# Patient Record
Sex: FEMALE | Race: BLACK OR AFRICAN AMERICAN | Employment: FULL TIME | URBAN - METROPOLITAN AREA
[De-identification: names, ages, dates, MRNs, and addresses within clinical notes are randomized per-mention and may not be internally consistent; named-entity substitution may affect disease eponyms.]

---

## 2017-12-27 ENCOUNTER — HOSPITAL ENCOUNTER (OUTPATIENT)
Dept: MAMMOGRAPHY | Age: 47
Discharge: HOME | End: 2017-12-27
Attending: PHYSICIAN ASSISTANT
Payer: COMMERCIAL

## 2017-12-27 DIAGNOSIS — Z12.31 VISIT FOR SCREENING MAMMOGRAM: ICD-10-CM

## 2017-12-27 PROCEDURE — 77067 SCR MAMMO BI INCL CAD: CPT

## 2018-03-22 ENCOUNTER — OFFICE VISIT (OUTPATIENT)
Dept: OBGYN CLINIC | Age: 48
End: 2018-03-22

## 2018-03-22 VITALS
SYSTOLIC BLOOD PRESSURE: 112 MMHG | HEIGHT: 66 IN | WEIGHT: 175 LBS | DIASTOLIC BLOOD PRESSURE: 70 MMHG | BODY MASS INDEX: 28.12 KG/M2

## 2018-03-22 DIAGNOSIS — N89.8 VAGINAL DISCHARGE: Primary | ICD-10-CM

## 2018-03-22 DIAGNOSIS — N92.6 IRREGULAR MENSES: ICD-10-CM

## 2018-03-22 DIAGNOSIS — N93.9 ABNORMAL UTERINE BLEEDING (AUB): ICD-10-CM

## 2018-03-22 LAB
HCG URINE, QL. (POC): NEGATIVE
VALID INTERNAL CONTROL?: YES
WET MOUNT POCT, WMPOCT: NORMAL

## 2018-03-22 NOTE — PROGRESS NOTES
[address] [facility]  [email or web address]  [phone]    Fiordaliza Leos MD, FACOG       OB/GYN Problem visit    Chief Complaint:   Chief Complaint   Patient presents with    Menstrual Problem       History of Present Illness: This is a new problem being evaluated by this provider. The patient is a 41 y.o. [de-identified]  female who reports having irregular bleeding for 1 months. Pt states that she had a normal period 2/16/18 followed by another period 3/6/18. Patient states that the second period was a little lighter, but still lasted about 7 days. Pt states this happened a few years ago, and they put her on birth control pills for a few months, and her periods became regular again. She reports the symptoms are has improved. Aggravating factors include none. Alleviating factors include none. Was on injectables with IUI? With [facility]    She does not have other concerns. H/o yeast infections. LMP: Patient's last menstrual period was 03/06/2018. [address]:  Past Medical History:   Diagnosis Date    Abnormal Pap smear of cervix 2007    per pt, had cryo     H/O mammogram 12/28/18    normal per pt.  Pap smear for cervical cancer screening 01/29/2018    Normal per pt. Past Surgical History:   Procedure Laterality Date    HX GYN  2007    Cryo for abnl pap per pt- location unknonwn for records b/c office is closed    HX OTHER SURGICAL  2016    toe surgery     Family History   Problem Relation Age of Onset    Breast Cancer Sister 40    Cancer Mother      Social History   Substance Use Topics    Smoking status: Never Smoker    Smokeless tobacco: Never Used    Alcohol use No     No Known Allergies  No current outpatient prescriptions on file. No current facility-administered medications for this visit.         Review of Systems:  History obtained from the patient  Constitutional: negative for fevers, chills and weight loss  ENT ROS: negative for - hearing change, oral lesions or visual changes  Respiratory: negative for cough, wheezing or dyspnea on exertion  Cardiovascular: negative for chest pain, irregular heart beats, exertional chest pressure/discomfort  Gastrointestinal: negative for dysphagia, nausea and vomiting  Genito-Urinary ROS:  see HPI  Inteument/breast: negative for rash, breast lump and nipple discharge  Musculoskeletal:negative for stiff joints, neck pain and muscle weakness  Endocrine ROS: negative for - breast changes, galactorrhea or temperature intolerance  Hematological and Lymphatic ROS: negative for - blood clots, bruising or swollen lymph nodes    Physical Exam:  Visit Vitals    /70    Ht 5' 6\" (1.676 m)    Wt 175 lb (79.4 kg)    BMI 28.25 kg/m2       GENERAL: alert, well appearing, and in no distress  HEAD: normocephalic, atraumatic. PULM: clear to auscultation, no wheezes, rales or rhonchi, symmetric air entry   COR: normal rate and regular rhythm, S1 and S2 normal   ABDOMEN: soft, nontender, nondistended, no masses or organomegaly   EGBUS: no lesions, no inflammation, no masses  VULVA: normal appearing vulva with no masses, tenderness or lesions  VAGINA: normal appearing vagina with normal color, no lesions, thin white discharge  CERVIX: normal appearing cervix without discharge or lesions, non tender  UTERUS: uterus is normal size, shape, consistency and nontender   ADNEXA: normal adnexa in size, nontender and no masses  NEURO: alert, oriented, normal speech    Assessment:  Encounter Diagnoses   Name Primary?  Vaginal discharge Yes    Abnormal uterine bleeding (AUB)     Irregular menses        Plan:  The patient is advised that she should contact the office if she does not note improvement or if symptoms recur  Recommend follow up with PCP for non-gynecologic complaints and chronic medical problems. She should contact our office with any questions or concerns  She could keep her routine annual exam appointment.    We discussed potential causes of symptomatic bleeding: including but not limited to hormonal, medical, infection/inflammation and structural etiologies. We discussed options for managing symptoms including but not limited to observation, NSAIDS, hormonal management, IUDs, ablation, and hysterectomy. Labs: defer to check what PCP did  Get records/labs/us from [facility]  We discussed timed intercourse, menstrual charting, and s/sx of ovulation. I recommended a daily prenatal vitamin. We discussed that if conception does not occur within one year then additional evaluation may be indicated. Fu and US/SIS  SIS h/o given  We discussed typical perimenopausal bleeding patterns and symptoms. I recommend that she notify MD for chaotic or symptomatic bleeding, or bleeding in between menses or intermenstrual bleeding for additional evaluation. She can also schedule a consult to discuss management of symptoms of perimenopause that are concerning her or interfering with her life or day to day function or activity.        Orders Placed This Encounter    AMB POC WET PREP (AKA STAIN, INTERPRET, WET MOUNT)    AMB POC URINE PREGNANCY TEST, VISUAL COLOR COMPARISON       Results for orders placed or performed in visit on 03/22/18   AMB POC SMEAR, STAIN & INTERPRET, WET MOUNT   Result Value Ref Range    Wet mount (POC)      Narrative    MASOOD    Hypae: negative  Buds: negative    Wet Prep:  Trich: negative  Clue cells: negative  Hyphae: negative  Buds: negative  WBC's: normal     AMB POC URINE PREGNANCY TEST, VISUAL COLOR COMPARISON   Result Value Ref Range    VALID INTERNAL CONTROL POC Yes     HCG urine, Ql. (POC) Negative Negative

## 2018-03-22 NOTE — PATIENT INSTRUCTIONS
Abnormal Uterine Bleeding: Care Instructions  Your Care Instructions    Abnormal uterine bleeding (AUB) is irregular bleeding from the uterus that is longer or heavier than usual or does not occur at your regular time. Sometimes it is caused by changes in hormone levels. It can also be caused by growths in the uterus, such as fibroids or polyps. Sometimes a cause cannot be found. You may have heavy bleeding when you are not expecting your period. Your doctor may suggest a pregnancy test, if you think you are pregnant. Follow-up care is a key part of your treatment and safety. Be sure to make and go to all appointments, and call your doctor if you are having problems. It's also a good idea to know your test results and keep a list of the medicines you take. How can you care for yourself at home? · Be safe with medicines. Take pain medicines exactly as directed. ¨ If the doctor gave you a prescription medicine for pain, take it as prescribed. ¨ If you are not taking a prescription pain medicine, ask your doctor if you can take an over-the-counter medicine. · You may be low in iron because of blood loss. Eat a balanced diet that is high in iron and vitamin C. Foods rich in iron include red meat, shellfish, eggs, beans, and leafy green vegetables. Talk to your doctor about whether you need to take iron pills or a multivitamin. When should you call for help? Call 911 anytime you think you may need emergency care. For example, call if:  ? · You passed out (lost consciousness). ?Call your doctor now or seek immediate medical care if:  ? · You have new or worse belly or pelvic pain. ? · You have severe vaginal bleeding. ? · You feel dizzy or lightheaded, or you feel like you may faint. ? Watch closely for changes in your health, and be sure to contact your doctor if:  ? · You think you may be pregnant. ? · Your bleeding gets worse. ? · You do not get better as expected.    Where can you learn more?  Go to http://negin-shefali.info/. Enter R990 in the search box to learn more about \"Abnormal Uterine Bleeding: Care Instructions. \"  Current as of: October 13, 2016  Content Version: 11.4  © 1275-6663 [employer]. Care instructions adapted under license by [email or web address] (which disclaims liability or warranty for this information). If you have questions about a medical condition or this instruction, always ask your healthcare professional. [place] [address] any warranty or liability for your use of this information.

## 2018-05-15 ENCOUNTER — OFFICE VISIT (OUTPATIENT)
Dept: OBGYN CLINIC | Age: 48
End: 2018-05-15

## 2018-05-15 VITALS — RESPIRATION RATE: 16 BRPM | DIASTOLIC BLOOD PRESSURE: 74 MMHG | SYSTOLIC BLOOD PRESSURE: 112 MMHG | HEIGHT: 66 IN

## 2018-05-15 DIAGNOSIS — R93.89 ABNORMAL GENITOURINARY ULTRASOUND: ICD-10-CM

## 2018-05-15 DIAGNOSIS — N88.2 CERVICAL STENOSIS (UTERINE CERVIX): Primary | ICD-10-CM

## 2018-05-15 DIAGNOSIS — N93.9 ABNORMAL UTERINE BLEEDING: ICD-10-CM

## 2018-05-15 NOTE — PROGRESS NOTES
[address] [facility]  [email or web address]    Sylvester Villar MD, [address] [facility]       OB/GYN Follow-up visit    Chief Complaint: Follow up visit  Chief Complaint   Patient presents with    Follow-up    Menstrual Problem       History of Present Illness: This is a follow up visit from 3/22/18. She is having a follow up for AUB. The patient reports having abnormal periods for 4 months. Pt states she has been having a cycle about every 2-3 weeks that last for about 5-7 days. She reports the symptoms are is unchanged. Aggravating factors include none. Alleviating factors include none. She does not have other concerns. LMP: Patient's last menstrual period was 04/25/2018. [address]:  Past Medical History:   Diagnosis Date    Abnormal Pap smear of cervix 2007    per pt, had cryo     H/O mammogram 12/28/18    normal per pt.  Pap smear for cervical cancer screening 01/29/2018    Normal per pt. Past Surgical History:   Procedure Laterality Date    HX GYN  2007    Cryo for abnl pap per pt- location unknonwn for records b/c office is closed    HX OTHER SURGICAL  2016    toe surgery     Family History   Problem Relation Age of Onset    Breast Cancer Sister 40    Cancer Mother      Social History   Substance Use Topics    Smoking status: Never Smoker    Smokeless tobacco: Never Used    Alcohol use No     No Known Allergies  No current outpatient prescriptions on file. No current facility-administered medications for this visit.         Review of Systems:  History obtained from the patient  Constitutional: negative for fevers, chills and weight loss  ENT ROS: negative for - hearing change, oral lesions or visual changes  Respiratory: negative for cough, wheezing or dyspnea on exertion  Cardiovascular: negative for chest pain, irregular heart beats, exertional chest pressure/discomfort  Gastrointestinal: negative for dysphagia, nausea and vomiting  Genito-Urinary ROS: see HPi  Inteument/breast: negative for rash, breast lump and nipple discharge  Musculoskeletal:negative for stiff joints, neck pain and muscle weakness  Endocrine ROS: negative for - breast changes, galactorrhea or temperature intolerance  Hematological and Lymphatic ROS: negative for - blood clots, bruising or swollen lymph nodes    Physical Exam:  Visit Vitals    /74    Resp 16    Ht 5' 6\" (1.676 m)       GENERAL: alert, well appearing, and in no distress  ABDOMEN: soft, nontender, nondistended, no masses or organomegaly   EGBUS: no lesions, no inflammation, no masses  VULVA: normal appearing vulva with no masses, tenderness or lesions  VAGINA: normal appearing vagina with normal color, no lesions, no discharge  CERVIX: normal appearing cervix without discharge or lesions, non tender, stenotic  UTERUS: uterus is normal size, shape, consistency and nontender   ADNEXA: normal adnexa in size, nontender and no masses  NEURO: alert, oriented, normal speech    Assessment:  Encounter Diagnoses   Name Primary?  Cervical stenosis (uterine cervix) Yes    Abnormal uterine bleeding     Abnormal genitourinary ultrasound        Plan:  The patient is advised that she should contact the office with any questions or concerns. She should make her routine annual gynecologic appointment if needed. Discussed risks, benefits and alternatives to procedure, including not performing it. Discussed bleeding, infection, anesthesia risks, damage to internal organs; bladder/bowel/other internal organs, scarring, additional procedures if needed. Plan hystero d and c polypectomy  Defer endo bx for OR sampling  Need copy of outside pap/AE prior to surger  Bleeding precautions  Disc option of hormonal management or observation, pt defers  Disc option of IUD/ablation, pt defers, ok if she gets pregnant  Disc [facility] fu postop if desired        No orders of the defined types were placed in this encounter.       No results found for this visit on 05/15/18. Sylvester Villar MD    Physician review of ultrasound performed by technician    Today's ultrasound report and images were reviewed and discussed with the patient. Please see images and imaging report entered by technician in PACS for more detail and progress note and diagnosis entered by MD.    Carol Watkins MD    UTERUS IS ANTEVERTED, NORMAL IN SIZE AND ECHOGENICITY. ENDOMETRIUM MEASURES 11MM IN THICKNESS. THE SIS WAS COMPLETED. FOLLOWING INSERTION OF THE CATHETER INTO THE UTERUS, AND  INJECTION OF SALINE THE ENDOMETRIAL CAVITY DISTENDED. TWO POLYPS WERE SEEN. POLYP 1,  ANTERIOR AND MEASURES 15 X 7 X 10MM. POLYP 2, POSTERIOR AND MEASURES 12 X 4 X 11MM. RIGHT OVARY APPEARS WNL. LEFT OVARY APPEARS WNL. SCANT FREE FLUID SEEN IN THE CDS. [facility]  OFFICE PROCEDURE PROGRESS NOTE        Chart reviewed for the following:   IDevi MD, have reviewed the History, Physical and updated the Allergic reactions for Rocael Brown     TIME OUT performed immediately prior to start of procedure:   Irvin Schafefer MD, have performed the following reviews on Antonella Zendejas prior to the start of the procedure:            * Patient was identified by name and date of birth   * Agreement on procedure being performed was verified  * Risks and Benefits explained to the patient  * Procedure site verified and marked as necessary  * Patient was positioned for comfort  * Consent was signed and verified     Time: 1130      Date of procedure: 5/15/2018    Procedure performed by: Devi Mantilla MD    Provider assisted by: Birdie Ozuna LPN    Patient assisted by: self    How tolerated by patient: tolerated the procedure well with no complications    Post Procedural Pain Scale: 2 - Hurts Little Bit    Comments: none    SONOHYSTEROGRAPHY    Antonella Zendejas is a [de-identified] ,  41 y.o. female [address] whose Patient's last menstrual period was 04/25/2018. was on 4/25/2018. , presents for a sonohysterography. The indications for this procedure were reviewed with the patient. The procedure was explained in detail and all questions were answered. Procedure: The patient was placed in the lithotomy position. A graves speculum was introduced into the vagina and the cervix was visualized. The cervix was prepped with zephrin solution. A [employer]'s Hysterography catheter was then introduced into the uterine cavity and the speculum was removed after cervical dilation because of stenosis. Sterile sonohysterography with 3D Reconstruction was performed. The endometrial cavity was distended with sterile saline. The findings are as follows: abnormal cavity with polyp lesions. The patient tolerated the procedure well without complication, and was discharged to home. Physician review of ultrasound performed by technician    Today's ultrasound report and images were reviewed and discussed with the patient.   Please see images and imaging report entered by technician in PACS for more detail and progress note and diagnosis entered by MD.    Nancy Maurer MD

## 2018-05-15 NOTE — MR AVS SNAPSHOT
[facility] [address] [place] [address] 
[phone] Patient: Vasiliy Chandler MRN: [id number] Visit Information Date & Time Provider Department Dept. Phone Encounter #  
 5/15/2018 11:00 AM Eber Rowe MD Terry Portillo [phone] [id number] Upcoming Health Maintenance Date Due  
 PAP AKA CERVICAL CYTOLOGY 9/2/2014 Influenza Age 5 to Adult 8/1/2018 Allergies as of 5/15/2018  Review Complete On: 5/15/2018 By: Bonny Pearce LPN No Known Allergies Current Immunizations  Never Reviewed No immunizations on file. Not reviewed this visit Vitals BP Resp Height(growth percentile) LMP OB Status Smoking Status 112/74 16 5' 6\" (1.676 m) 04/25/2018 Unknown Never Smoker Preferred Pharmacy Pharmacy Name Phone [id number], [address] [place] [address] [phone] Your Updated Medication List  
  
Notice  As of 5/15/2018 11:51 AM  
 You have not been prescribed any medications. Patient Instructions Sonohysterogram: About This Test 
What is it? A sonohysterogram (say \"ADE-fly-XYLG-ter-uh-gram\") is a type of pelvic ultrasound test. It uses reflected sound waves to make a picture of the inside of the uterus. For this test, a thin, lubricated tool called a transducer is placed in the vagina. The transducer sends and receives the sound waves that create the picture. The doctor or technician puts salt water (saline) into the uterus through a tube inserted into the cervix. The saline separates the walls of the uterus, making features easier to see. Why is this test done? A sonohysterogram may be done if other tests don't show enough detail. A clearer view can help to check the uterus for: · Growths or masses, scarring, or an abnormal shape. · The cause of heavy bleeding, miscarriages, or trouble getting pregnant. How can you prepare for the test? 
· Schedule this test for the first few days after your period has ended. What happens before the test? 
· You will need to remove any jewelry that might be in the way of the transducer. · You will need to take off most of your clothes and wear a gown during the test. 
· You may take an over-the-counter pain medicine before the test to help relieve cramps during the test. 
What happens during the test? 
· You lie down on your back on an exam table with your hips slightly raised. · The tip of a transducer is gently put into your vagina. The transducer may be moved around to get a complete view. The images from the test are shown on a video monitor. Then the transducer is removed. · A thin flexible tube (catheter) is put into your uterus through your cervix. The doctor uses the tube to inject saline into your uterus. · The transducer is put back in and more images are taken. Then it is removed. What else should you know about the test? 
· You may feel some discomfort as the transducer is put into your vagina. · You may feel some cramping when the catheter is inserted through the cervix and the saline is injected into your uterus. · This test doesn't use X-rays or an iodine dye. You won't hear or feel the sound waves. How long does the test take? · The test will take about 15 to 30 minutes. What happens after the test? 
· You probably will be able to go home right away. · You can go back to your usual activities right away. · You may have some cramping, spotting, or watery discharge for a couple of days after the test. Wearing a pad can help absorb the discharge. You can take an over-the-counter pain medicine to relieve any cramping. Follow-up care is a key part of your treatment and safety.  Be sure to make and go to all appointments, and call your doctor if you are having problems. It's also a good idea to keep a list of the medicines you take. Ask your doctor when you can expect to have your test results. Where can you learn more? Go to http://negin-shefali.info/. Enter [id number] in the search box to learn more about \"Sonohysterogram: About This Test.\" Current as of: March 16, 2017 Content Version: 11.4 © 8148-9994 [employer]. Care instructions adapted under license by [employer] (which disclaims liability or warranty for this information). If you have questions about a medical condition or this instruction, always ask your healthcare professional. [address] any warranty or liability for your use of this information. Introducing [facility]! [employer] introduces Little Pim patient portal. Now you can access parts of your medical record, email your doctor's office, and request medication refills online. 1. In your internet browser, go to [email or web address] 2. Click on the First Time User? Click Here link in the Sign In box. You will see the New Member Sign Up page. 3. Enter your Little Pim Access Code exactly as it appears below. You will not need to use this code after youve completed the sign-up process. If you do not sign up before the expiration date, you must request a new code. · Little Pim Access Code: [email or web address] Expires: 6/20/2018 11:05 AM 
 
4. Enter the last four digits of your Social Security Number (xxxx) and Date of Birth (mm/dd/yyyy) as indicated and click Submit. You will be taken to the next sign-up page. 5. Create a Fifty100t ID. This will be your Little Pim login ID and cannot be changed, so think of one that is secure and easy to remember. 6. Create a Little Pim password. You can change your password at any time. 7. Enter your Password Reset Question and Answer. This can be used at a later time if you forget your password. 8. Enter your e-mail address. You will receive e-mail notification when new information is available in [address] 9. Click Sign Up. You can now view and download portions of your medical record. 10. Click the Download Summary menu link to download a portable copy of your medical information. If you have questions, please visit the Frequently Asked Questions section of the [facility] website. Remember, [facility] is NOT to be used for urgent needs. For medical emergencies, dial 911. Now available from your iPhone and Android! Please provide this summary of care documentation to your next provider. Your primary care clinician is listed as Yeyo Hay. If you have any questions after today's visit, please call [phone].

## 2018-05-15 NOTE — PATIENT INSTRUCTIONS
Sonohysterogram: About This Test  What is it? A sonohysterogram (say \"MBJ-ceu-QNZJ-ter-uh-gram\") is a type of pelvic ultrasound test. It uses reflected sound waves to make a picture of the inside of the uterus. For this test, a thin, lubricated tool called a transducer is placed in the vagina. The transducer sends and receives the sound waves that create the picture. The doctor or technician puts salt water (saline) into the uterus through a tube inserted into the cervix. The saline separates the walls of the uterus, making features easier to see. Why is this test done? A sonohysterogram may be done if other tests don't show enough detail. A clearer view can help to check the uterus for:  · Growths or masses, scarring, or an abnormal shape. · The cause of heavy bleeding, miscarriages, or trouble getting pregnant. How can you prepare for the test?  · Schedule this test for the first few days after your period has ended. What happens before the test?  · You will need to remove any jewelry that might be in the way of the transducer. · You will need to take off most of your clothes and wear a gown during the test.  · You may take an over-the-counter pain medicine before the test to help relieve cramps during the test.  What happens during the test?  · You lie down on your back on an exam table with your hips slightly raised. · The tip of a transducer is gently put into your vagina. The transducer may be moved around to get a complete view. The images from the test are shown on a video monitor. Then the transducer is removed. · A thin flexible tube (catheter) is put into your uterus through your cervix. The doctor uses the tube to inject saline into your uterus. · The transducer is put back in and more images are taken. Then it is removed. What else should you know about the test?  · You may feel some discomfort as the transducer is put into your vagina.   · You may feel some cramping when the catheter is inserted through the cervix and the saline is injected into your uterus. · This test doesn't use X-rays or an iodine dye. You won't hear or feel the sound waves. How long does the test take? · The test will take about 15 to 30 minutes. What happens after the test?  · You probably will be able to go home right away. · You can go back to your usual activities right away. · You may have some cramping, spotting, or watery discharge for a couple of days after the test. Wearing a pad can help absorb the discharge. You can take an over-the-counter pain medicine to relieve any cramping. Follow-up care is a key part of your treatment and safety. Be sure to make and go to all appointments, and call your doctor if you are having problems. It's also a good idea to keep a list of the medicines you take. Ask your doctor when you can expect to have your test results. Where can you learn more? Go to http://negin-shefali.info/. Enter [id number] in the search box to learn more about \"Sonohysterogram: About This Test.\"  Current as of: March 16, 2017  Content Version: 11.4  © 6658-1871 [employer]. Care instructions adapted under license by [employer] (which disclaims liability or warranty for this information). If you have questions about a medical condition or this instruction, always ask your healthcare professional. [place] [address] any warranty or liability for your use of this information.

## 2018-06-04 DIAGNOSIS — N93.8 DUB (DYSFUNCTIONAL UTERINE BLEEDING): Primary | ICD-10-CM

## 2018-06-05 NOTE — PERIOP NOTES
1976 [facility] [address], [address]    MAIN OR [phone]    MAIN PRE OP [phone]    AMBULATORY PRE OP [phone]    PRE-ADMISSION TESTING [phone]       Surgery Date:   7/2/2018       Is surgery arrival time given by surgeon? YES  If NO, [facility] staff will call you between 3 and 7pm the day before your surgery with your arrival time. (If your surgery is on a Monday, we will call you the Friday before.)    Call [phone] after 7pm Monday-Friday if you did not receive your arrival time. Answers to Common Questions   When You  Arrive   Arrive at the [address] [facility] on the day of your surgery  Have your insurance card, photo ID, and any copayment (if needed)     Food   and   Drink   NO food or drink after midnight the night before surgery    This means NO water, gum, mints, coffee, juice, etc.  No alcohol (beer, wine, liquor) 24 hours before and after surgery     Medicine to   TAKE   Morning of Surgery   MEDICATIONS TO TAKE THE MORNING OF SURGERY WITH A SIP OF WATER:    None. Stop you prenatal and probiotic 1 week prior to surgery.      Medicine  To  STOP   FOR PAIN   NO Aspirin for pain    NO Non-Steroidal Anti-Inflammatory Drugs (NSAIDs:   for example, Ibuprofen (Advil, Motrin), Naproxen (Aleve)   STOP herbal supplements and vitamins 1 week before surgery   You can take Tylenol  follow instructions on the bottle     Blood  Thinners    If you take Aspirin, Plavix, Coumadin, blood-thinning or anti-clot medicine, talk to your surgeon and/or follow the instructions from the doctor who told you to take that medicine     Clothing  [facility] [address] Wear loose, comfortable clothes   Wear glasses instead of contacts   Leave money, jewelry and valuables at home   No make-up, particularly mascara, the day of surgery   REMOVE ALL piercings, rings, and jewelry - leave at home   Wear hair loose or down; no pony-tails, buns, or metal hair clips    BATHING   Follow all special bath instructions (for total joint replacement, spine and bowel surgeries.)   If you shower the morning of surgery, please do not apply any lotions, powders, or deodorants afterwards. Do not shave or trim anywhere 24 hours before surgery. Going Home  or  Spending the Night    SAME-DAY SURGERY: You must have a responsible adult drive you home and stay with you 24 hours after surgery   ADMITS: If your doctor is keeping you into the hospital after surgery, leave personal belongings/luggage in your car until you have a hospital room number. Hospital discharge time is 12 noon  Drivers must be here before 12 noon unless you are told differently         Follow all instructions so your surgery wont be cancelled. Please, be on time. If a situation occurs and you are delayed the day of surgery, call [phone] or [phone]. If your physical condition changes (like a fever, cold, flu, etc.) call your surgeon as soon as possible. The Preadmission Testing staff can be reached at [phone]. OTHER SPECIAL INSTRUCTIONS:  Free  parking 7am-5pm    The patient was contacted  via phone. She  verbalize  understanding of all instructions and does not  need reinforcement.

## 2018-06-29 ENCOUNTER — ANESTHESIA EVENT (OUTPATIENT)
Dept: SURGERY | Age: 48
End: 2018-06-29
Payer: COMMERCIAL

## 2018-07-02 ENCOUNTER — ANESTHESIA (OUTPATIENT)
Dept: SURGERY | Age: 48
End: 2018-07-02
Payer: COMMERCIAL

## 2018-07-02 ENCOUNTER — HOSPITAL ENCOUNTER (OUTPATIENT)
Age: 48
Setting detail: OUTPATIENT SURGERY
Discharge: HOME | End: 2018-07-02
Attending: OBSTETRICS & GYNECOLOGY | Admitting: OBSTETRICS & GYNECOLOGY
Payer: COMMERCIAL

## 2018-07-02 VITALS
TEMPERATURE: 98.9 F | OXYGEN SATURATION: 99 % | HEART RATE: 72 BPM | HEIGHT: 66 IN | DIASTOLIC BLOOD PRESSURE: 60 MMHG | RESPIRATION RATE: 19 BRPM | SYSTOLIC BLOOD PRESSURE: 114 MMHG | BODY MASS INDEX: 28.13 KG/M2 | WEIGHT: 175.04 LBS

## 2018-07-02 DIAGNOSIS — N93.8 DUB (DYSFUNCTIONAL UTERINE BLEEDING): ICD-10-CM

## 2018-07-02 LAB
ABO + RH BLD: NORMAL
BLOOD GROUP ANTIBODIES SERPL: NORMAL
ERYTHROCYTE [DISTWIDTH] IN BLOOD BY AUTOMATED COUNT: 14.6 % (ref 11.5–14.5)
HCG UR QL: NEGATIVE
HCT VFR BLD AUTO: 35.5 % (ref 35–47)
HGB BLD-MCNC: 12.5 G/DL (ref 11.5–16)
MCH RBC QN AUTO: 28.4 PG (ref 26–34)
MCHC RBC AUTO-ENTMCNC: 35.2 G/DL (ref 30–36.5)
MCV RBC AUTO: 80.7 FL (ref 80–99)
NRBC # BLD: 0 K/UL (ref 0–0.01)
NRBC BLD-RTO: 0 PER 100 WBC
PLATELET # BLD AUTO: 252 K/UL (ref 150–400)
PMV BLD AUTO: 10.3 FL (ref 8.9–12.9)
RBC # BLD AUTO: 4.4 M/UL (ref 3.8–5.2)
SPECIMEN EXP DATE BLD: NORMAL
WBC # BLD AUTO: 5.1 K/UL (ref 3.6–11)

## 2018-07-02 PROCEDURE — 76060000032 HC ANESTHESIA 0.5 TO 1 HR: Performed by: OBSTETRICS & GYNECOLOGY

## 2018-07-02 PROCEDURE — 77030032490 HC SLV COMPR SCD KNE COVD -B: Performed by: OBSTETRICS & GYNECOLOGY

## 2018-07-02 PROCEDURE — 76210000021 HC REC RM PH II 0.5 TO 1 HR: Performed by: OBSTETRICS & GYNECOLOGY

## 2018-07-02 PROCEDURE — 77030020143 HC AIRWY LARYN INTUB CGAS -A: Performed by: ANESTHESIOLOGY

## 2018-07-02 PROCEDURE — 88305 TISSUE EXAM BY PATHOLOGIST: CPT | Performed by: OBSTETRICS & GYNECOLOGY

## 2018-07-02 PROCEDURE — 86900 BLOOD TYPING SEROLOGIC ABO: CPT | Performed by: OBSTETRICS & GYNECOLOGY

## 2018-07-02 PROCEDURE — 36415 COLL VENOUS BLD VENIPUNCTURE: CPT | Performed by: OBSTETRICS & GYNECOLOGY

## 2018-07-02 PROCEDURE — 85027 COMPLETE CBC AUTOMATED: CPT | Performed by: OBSTETRICS & GYNECOLOGY

## 2018-07-02 PROCEDURE — 74011000250 HC RX REV CODE- 250

## 2018-07-02 PROCEDURE — 76210000006 HC OR PH I REC 0.5 TO 1 HR: Performed by: OBSTETRICS & GYNECOLOGY

## 2018-07-02 PROCEDURE — 77030032151 HC HYSTSCP FLD MGMT KT DISP S&N -D: Performed by: OBSTETRICS & GYNECOLOGY

## 2018-07-02 PROCEDURE — 74011250636 HC RX REV CODE- 250/636

## 2018-07-02 PROCEDURE — 74011250636 HC RX REV CODE- 250/636: Performed by: ANESTHESIOLOGY

## 2018-07-02 PROCEDURE — 76010000138 HC OR TIME 0.5 TO 1 HR: Performed by: OBSTETRICS & GYNECOLOGY

## 2018-07-02 PROCEDURE — 77030018836 HC SOL IRR NACL ICUM -A: Performed by: OBSTETRICS & GYNECOLOGY

## 2018-07-02 PROCEDURE — 77030031763 HC BLD INCIS TRUCLR PLS S&N -F: Performed by: OBSTETRICS & GYNECOLOGY

## 2018-07-02 PROCEDURE — 81025 URINE PREGNANCY TEST: CPT

## 2018-07-02 PROCEDURE — 77030005537 HC CATH URETH BARD -A: Performed by: OBSTETRICS & GYNECOLOGY

## 2018-07-02 RX ORDER — SODIUM CHLORIDE 0.9 % (FLUSH) 0.9 %
5-10 SYRINGE (ML) INJECTION EVERY 8 HOURS
Status: DISCONTINUED | OUTPATIENT
Start: 2018-07-02 | End: 2018-07-02 | Stop reason: HOSPADM

## 2018-07-02 RX ORDER — SODIUM CHLORIDE 0.9 % (FLUSH) 0.9 %
5-10 SYRINGE (ML) INJECTION AS NEEDED
Status: DISCONTINUED | OUTPATIENT
Start: 2018-07-02 | End: 2018-07-02 | Stop reason: HOSPADM

## 2018-07-02 RX ORDER — LIDOCAINE HYDROCHLORIDE 20 MG/ML
INJECTION, SOLUTION EPIDURAL; INFILTRATION; INTRACAUDAL; PERINEURAL AS NEEDED
Status: DISCONTINUED | OUTPATIENT
Start: 2018-07-02 | End: 2018-07-02 | Stop reason: HOSPADM

## 2018-07-02 RX ORDER — ONDANSETRON 2 MG/ML
INJECTION INTRAMUSCULAR; INTRAVENOUS AS NEEDED
Status: DISCONTINUED | OUTPATIENT
Start: 2018-07-02 | End: 2018-07-02 | Stop reason: HOSPADM

## 2018-07-02 RX ORDER — LIDOCAINE HYDROCHLORIDE 10 MG/ML
0.1 INJECTION, SOLUTION EPIDURAL; INFILTRATION; INTRACAUDAL; PERINEURAL AS NEEDED
Status: DISCONTINUED | OUTPATIENT
Start: 2018-07-02 | End: 2018-07-02 | Stop reason: HOSPADM

## 2018-07-02 RX ORDER — DEXAMETHASONE SODIUM PHOSPHATE 4 MG/ML
INJECTION, SOLUTION INTRA-ARTICULAR; INTRALESIONAL; INTRAMUSCULAR; INTRAVENOUS; SOFT TISSUE AS NEEDED
Status: DISCONTINUED | OUTPATIENT
Start: 2018-07-02 | End: 2018-07-02 | Stop reason: HOSPADM

## 2018-07-02 RX ORDER — PROPOFOL 10 MG/ML
INJECTION, EMULSION INTRAVENOUS AS NEEDED
Status: DISCONTINUED | OUTPATIENT
Start: 2018-07-02 | End: 2018-07-02 | Stop reason: HOSPADM

## 2018-07-02 RX ORDER — MIDAZOLAM HYDROCHLORIDE 1 MG/ML
INJECTION, SOLUTION INTRAMUSCULAR; INTRAVENOUS AS NEEDED
Status: DISCONTINUED | OUTPATIENT
Start: 2018-07-02 | End: 2018-07-02 | Stop reason: HOSPADM

## 2018-07-02 RX ORDER — HYDROMORPHONE HYDROCHLORIDE 2 MG/ML
INJECTION, SOLUTION INTRAMUSCULAR; INTRAVENOUS; SUBCUTANEOUS AS NEEDED
Status: DISCONTINUED | OUTPATIENT
Start: 2018-07-02 | End: 2018-07-02 | Stop reason: HOSPADM

## 2018-07-02 RX ORDER — NALOXONE HYDROCHLORIDE 0.4 MG/ML
0.2 INJECTION, SOLUTION INTRAMUSCULAR; INTRAVENOUS; SUBCUTANEOUS
Status: DISCONTINUED | OUTPATIENT
Start: 2018-07-02 | End: 2018-07-02 | Stop reason: HOSPADM

## 2018-07-02 RX ORDER — SODIUM CHLORIDE, SODIUM LACTATE, POTASSIUM CHLORIDE, CALCIUM CHLORIDE 600; 310; 30; 20 MG/100ML; MG/100ML; MG/100ML; MG/100ML
125 INJECTION, SOLUTION INTRAVENOUS CONTINUOUS
Status: DISCONTINUED | OUTPATIENT
Start: 2018-07-02 | End: 2018-07-02 | Stop reason: HOSPADM

## 2018-07-02 RX ORDER — FENTANYL CITRATE 50 UG/ML
50 INJECTION, SOLUTION INTRAMUSCULAR; INTRAVENOUS
Status: DISCONTINUED | OUTPATIENT
Start: 2018-07-02 | End: 2018-07-02 | Stop reason: HOSPADM

## 2018-07-02 RX ORDER — DIPHENHYDRAMINE HYDROCHLORIDE 50 MG/ML
12.5 INJECTION, SOLUTION INTRAMUSCULAR; INTRAVENOUS AS NEEDED
Status: DISCONTINUED | OUTPATIENT
Start: 2018-07-02 | End: 2018-07-02 | Stop reason: HOSPADM

## 2018-07-02 RX ORDER — FENTANYL CITRATE 50 UG/ML
INJECTION, SOLUTION INTRAMUSCULAR; INTRAVENOUS AS NEEDED
Status: DISCONTINUED | OUTPATIENT
Start: 2018-07-02 | End: 2018-07-02 | Stop reason: HOSPADM

## 2018-07-02 RX ORDER — HYDROMORPHONE HYDROCHLORIDE 1 MG/ML
.25-1 INJECTION, SOLUTION INTRAMUSCULAR; INTRAVENOUS; SUBCUTANEOUS
Status: DISCONTINUED | OUTPATIENT
Start: 2018-07-02 | End: 2018-07-02

## 2018-07-02 RX ORDER — IBUPROFEN 600 MG/1
600 TABLET ORAL
Qty: 30 TAB | Refills: 0 | Status: SHIPPED | OUTPATIENT
Start: 2018-07-02

## 2018-07-02 RX ORDER — FLUMAZENIL 0.1 MG/ML
0.2 INJECTION INTRAVENOUS
Status: DISCONTINUED | OUTPATIENT
Start: 2018-07-02 | End: 2018-07-02 | Stop reason: HOSPADM

## 2018-07-02 RX ADMIN — FENTANYL CITRATE 25 MCG: 50 INJECTION, SOLUTION INTRAMUSCULAR; INTRAVENOUS at 07:39

## 2018-07-02 RX ADMIN — DEXAMETHASONE SODIUM PHOSPHATE 8 MG: 4 INJECTION, SOLUTION INTRA-ARTICULAR; INTRALESIONAL; INTRAMUSCULAR; INTRAVENOUS; SOFT TISSUE at 07:41

## 2018-07-02 RX ADMIN — LIDOCAINE HYDROCHLORIDE 60 MG: 20 INJECTION, SOLUTION EPIDURAL; INFILTRATION; INTRACAUDAL; PERINEURAL at 07:37

## 2018-07-02 RX ADMIN — HYDROMORPHONE HYDROCHLORIDE 0.5 MG: 2 INJECTION, SOLUTION INTRAMUSCULAR; INTRAVENOUS; SUBCUTANEOUS at 08:16

## 2018-07-02 RX ADMIN — SODIUM CHLORIDE, SODIUM LACTATE, POTASSIUM CHLORIDE, AND CALCIUM CHLORIDE 125 ML/HR: 600; 310; 30; 20 INJECTION, SOLUTION INTRAVENOUS at 06:24

## 2018-07-02 RX ADMIN — FENTANYL CITRATE 25 MCG: 50 INJECTION, SOLUTION INTRAMUSCULAR; INTRAVENOUS at 07:43

## 2018-07-02 RX ADMIN — FENTANYL CITRATE 25 MCG: 50 INJECTION, SOLUTION INTRAMUSCULAR; INTRAVENOUS at 07:55

## 2018-07-02 RX ADMIN — FENTANYL CITRATE 25 MCG: 50 INJECTION, SOLUTION INTRAMUSCULAR; INTRAVENOUS at 07:41

## 2018-07-02 RX ADMIN — PROPOFOL 150 MG: 10 INJECTION, EMULSION INTRAVENOUS at 07:37

## 2018-07-02 RX ADMIN — MIDAZOLAM HYDROCHLORIDE 3 MG: 1 INJECTION, SOLUTION INTRAMUSCULAR; INTRAVENOUS at 07:30

## 2018-07-02 RX ADMIN — ONDANSETRON 4 MG: 2 INJECTION INTRAMUSCULAR; INTRAVENOUS at 08:16

## 2018-07-02 RX ADMIN — MIDAZOLAM HYDROCHLORIDE 2 MG: 1 INJECTION, SOLUTION INTRAMUSCULAR; INTRAVENOUS at 07:35

## 2018-07-02 NOTE — OP NOTES
HYSTEROSCOPY D & C  POLYPECTOMY with TRUCLEAR FULL OP NOTE    Patient:  Jose L Shanks  DATE OF PROCEDURE:  7/2/2018    PREOPERATIVE DIAGNOSIS:    Encounter Diagnoses     ICD-10-CM ICD-9-CM   1. DUB (dysfunctional uterine bleeding) N93.8 626.8   2. Abnormal genitourinary ultrasound  POSTOPERATIVE DIAGNOSIS: same, endometrial polyps    PROCEDURE:  Hysteroscopy, dilation and curettage, polypectomy with Truclear    SURGEON:  Shanice Muñoz MD    ASSISTANT:  OR staff    ANESTHESIA: General endotracheal anesthesia    EBL: less than 50 ml    FINDINGS: Small AV uterus, stenotic appearing cervix. Hysteroscopic findings, bilobed polyp anterior wall with smaller polyp posterior left well. Specimen:  Endometrial tissue, hysteroscopic resection and curettage     Hysteroscopic fluid deficit: [id number]    Complications: none    PROCEDURE: The patient was placed on the operating table in the supine position. Patient was placed under anesthesia. She was prepped and draped in the usual fashion for vaginal surgery and her bladder was emptied with a straight catheterization: scant urine noted. Time out was done to confirm the operating procedure, surgeon, patient and site. A bimanual exam revealed a small anterior position uterus and closed cervix. The cervix was visualized with the aid of a bivalve vaginal speculum and grasped with a single-tooth tenaculum and serially dilated to allow passage of the diagnostic hysteroscope. Normal saline was infused to allow visualization of the uterine cavity and bilateral tubal ostia and above noted findings. The Truclear Incisor hysteroscopic resector was introduced under direct visualization and window locked. The polyps were easily removed and a hysteroscopic currettage was performed of all of the uterine surfaces. Good uterine distention was noted without evidence of perforation. No residual intracavitary pathology was noted.       The hysteroscope was removed and a sharp curettage of the uterus was performed on all surfaces. The tissue was sent to pathology. There was some bleeding at the tenaculum site that was made hemostatic with a silver nitrate stick and pressure. There was minimal bleeding. Instruments were removed. All counts were correct. The patient went to the recovery room in satisfactory condition. See intraoperative images for more documentation of findings.        Cleveland Caro MD, Deanna Cook

## 2018-07-02 NOTE — ANESTHESIA POSTPROCEDURE EVALUATION
Post-Anesthesia Evaluation and Assessment    Patient: Iona Franco MRN: [id number]  SSN: [id number]    YOB: 1970  Age: 41 y.o. Sex: female       Cardiovascular Function/Vital Signs  Visit Vitals    /60    Pulse 72    Temp 37.2 °C (98.9 °F)    Resp 19    Ht 5' 6.25\" (1.683 m)    Wt 79.4 kg (175 lb 0.7 oz)    SpO2 99%    BMI 28.04 kg/m2       Patient is status post general anesthesia for Procedure(s): HYSTEROSCOPY, DILITATION AND CURRETAGE, POLYPECTOMY WITH TRUE CLEAR. Nausea/Vomiting: None    Postoperative hydration reviewed and adequate. Pain:  Pain Scale 1: Numeric (0 - 10) (07/02/18 0849)  Pain Intensity 1: 0 (07/02/18 0849)   Managed    Neurological Status:   Neuro (WDL): Within Defined Limits (07/02/18 0849)  Neuro  Neurologic State: Drowsy (07/02/18 0035)  Orientation Level: Oriented X4 (07/02/18 0849)  Cognition: Appropriate decision making; Appropriate safety awareness (07/02/18 4544)  Speech: Clear (07/02/18 0849)  LUE Motor Response: Purposeful (07/02/18 0849)  LLE Motor Response: Purposeful (07/02/18 0849)  RUE Motor Response: Purposeful (07/02/18 0849)  RLE Motor Response: Purposeful (07/02/18 0849)   At baseline    Mental Status and Level of Consciousness: Arousable    Pulmonary Status:   O2 Device: Room air (07/02/18 0849)   Adequate oxygenation and airway patent    Complications related to anesthesia: None    Post-anesthesia assessment completed.  No concerns    Signed By: Moris Paniagua MD     July 2, 2018

## 2018-07-02 NOTE — PROGRESS NOTES
HYSTEROSCOPY D & C  POLYPECTOMY with TRUCLEAR FULL OP NOTE    Patient:  Lauro Diaz  DATE OF PROCEDURE:  7/2/2018    PREOPERATIVE DIAGNOSIS:    Encounter Diagnoses     ICD-10-CM ICD-9-CM   1. DUB (dysfunctional uterine bleeding) N93.8 626.8   2. Abnormal genitourinary ultrasound  POSTOPERATIVE DIAGNOSIS: same, endometrial polyps    PROCEDURE:  Hysteroscopy, dilation and curettage, polypectomy with Truclear    SURGEON:  Milton Reis MD    ASSISTANT:  OR staff    ANESTHESIA: General endotracheal anesthesia    EBL: less than 50 ml    FINDINGS: Small AV uterus, stenotic appearing cervix. Hysteroscopic findings, bilobed polyp anterior wall with smaller polyp posterior left well. Specimen:  Endometrial tissue, hysteroscopic resection and curettage     Hysteroscopic fluid deficit: 394YY    Complications: none    PROCEDURE: The patient was placed on the operating table in the supine position. Patient was placed under anesthesia. She was prepped and draped in the usual fashion for vaginal surgery and her bladder was emptied with a straight catheterization: scant urine noted. Time out was done to confirm the operating procedure, surgeon, patient and site. A bimanual exam revealed a small anterior position uterus and closed cervix. The cervix was visualized with the aid of a bivalve vaginal speculum and grasped with a single-tooth tenaculum and serially dilated to allow passage of the diagnostic hysteroscope. Normal saline was infused to allow visualization of the uterine cavity and bilateral tubal ostia and above noted findings. The Truclear Incisor hysteroscopic resector was introduced under direct visualization and window locked. The polyps were easily removed and a hysteroscopic currettage was performed of all of the uterine surfaces. Good uterine distention was noted without evidence of perforation. No residual intracavitary pathology was noted.       The hysteroscope was removed and a sharp curettage of the uterus was performed on all surfaces. The tissue was sent to pathology. There was some bleeding at the tenaculum site that was made hemostatic with a silver nitrate stick and pressure. There was minimal bleeding. Instruments were removed. All counts were correct. The patient went to the recovery room in satisfactory condition. See intraoperative images for more documentation of findings.        Alton Adams MD, Debbie Benitez

## 2018-07-02 NOTE — PROGRESS NOTES
Previous US  UTERUS IS ANTEVERTED, NORMAL IN SIZE AND ECHOGENICITY. ENDOMETRIUM MEASURES 11MM IN THICKNESS. THE SIS WAS COMPLETED. FOLLOWING INSERTION OF THE CATHETER INTO THE UTERUS, AND  INJECTION OF SALINE THE ENDOMETRIAL CAVITY DISTENDED. TWO POLYPS WERE SEEN. POLYP 1,  ANTERIOR AND MEASURES 15 X 7 X 10MM. POLYP 2, POSTERIOR AND MEASURES 12 X 4 X 11MM. RIGHT OVARY APPEARS WNL. LEFT OVARY APPEARS WNL.   SCANT FREE FLUID SEEN IN THE CDS

## 2018-07-02 NOTE — BRIEF OP NOTE
BRIEF OPERATIVE NOTE    Date of Procedure: 7/2/2018   Preoperative Diagnosis: ABNORMAL UTERINE BLEED  Postoperative Diagnosis: ABNORMAL UTERINE BLEED    Procedure(s):   HYSTEROSCOPY, DILITATION AND CURRETAGE, POLYPECTOMY WITH TRUE CLEAR  Surgeon(s) and Role:     * Hui Rogers MD - Primary         Surgical Assistant: OR staff    Surgical Staff:  Circ-1: Ira Mcintosh RN  Circ-2: Leonardo Chi RN  Scrub Tech-1: [facility]  Event Time In   Incision Start 2334   Incision Close 4412     Anesthesia: General   Estimated Blood Loss: < 50 cc  Specimens:   ID Type Source Tests Collected by Time Destination   1 : endometrial currettings and hysteroscopic polypectomy  Preservative Uterus  Hui Rogers MD 7/2/2018 8377 Pathology      Findings: see op note   Complications: see op note  Implants: * No implants in log *

## 2018-07-02 NOTE — H&P
Gynecology History and Physical    Name: Moris Mcfarlane MRN: [id number] SSN: [id number]    YOB: 1970  Age: 41 y.o. Sex: female       Subjective:      Chief complaint:  Verito Velasquez is a 41 y.o. female with a history of abnormal uterine bleeding. Previous workup included US and SIS that showed suspected polyps. Previous treatment measures included observation, NSAIDs. She is admitted for Procedure(s) (LRB):  HYSTEROSCOPY, DILITATION AND CURRETAGE, POLYPECTOMY WITH TRUE CLEAR (N/A). Mammogram results:    Results from [place] encounter on 17   [facility] MAMMO BI SCREENING INCL CAD   Narrative STUDY: Bilateral digital screening mammogram    INDICATION:  Screening. COMPARISON:  Priors dating back to     BREAST COMPOSITION:  The breasts are heterogeneously dense, which may obscure  small masses. FINDINGS: Bilateral digital screening mammography was performed and is  interpreted in conjunction with a computer assisted detection (CAD) system. No  suspicious masses or calcifications are identified. There has been no  significant change. Impression IMPRESSION:  BI-RADS 1: Negative. No mammographic evidence of malignancy. RECOMMENDATIONS:  Next screening mammogram is recommended in one year. The patient will be notified of these results. OB History      Para Term  AB Living    0 0 0 0 0 0    SAB TAB Ectopic Molar Multiple Live Births    0 0 0 0 0 0        Past Medical History:   Diagnosis Date    Abnormal Pap smear of cervix     per pt, had cryo     H/O mammogram 18    normal per pt.  Pap smear for cervical cancer screening 2018    Normal per pt. Past Surgical History:   Procedure Laterality Date    HX GYN  2007    Cryo for abnl pap per pt- location unknonwn for records b/c office is closed    HX OTHER SURGICAL  2016    toe surgery     Social History     Occupational History    Not on file.      Social History Main Topics    Smoking status: Never Smoker    Smokeless tobacco: Never Used    Alcohol use No    Drug use: No    Sexual activity: Yes     Partners: Male     Birth control/ protection: None     Family History   Problem Relation Age of Onset    Breast Cancer Sister 40    Cancer Mother      There are no active hospital problems to display for this patient. No Known Allergies  Prior to Admission medications    Medication Sig Start Date End Date Taking? Authorizing Provider   PNV HN.43/LBWJTWN fum/folic ac (PRENATAL PO) Take 1 Tab by mouth daily. Yes Historical Provider   Lactobacillus acidophilus (PROBIOTIC PO) Take 1 Cap by mouth daily. Yes Historical Provider        Review of Systems:  A comprehensive review of systems was negative except for that written in the History of Present Illness. Objective:     Vitals:    18 0859 18 0601   BP:  124/63   Pulse:  64   Resp:  18   Temp:  98.3 °F (36.8 °C)   SpO2:  100%   Weight: 160 lb (72.6 kg) 175 lb 0.7 oz (79.4 kg)   Height: 5' 6\" (1.676 m) 5' 6.25\" (1.683 m)       Physical Exam:  Patient without distress. Heart: Regular rate and rhythm or S1S2 present  Lung: clear to auscultation throughout lung fields, no wheezes, no rales, no rhonchi and normal respiratory effort  Abdomen: soft, nontender  Extremities, lower: no c/t/e bilaterally  Pelvic exam: defferred to OR    Lab Results   Component Value Date/Time    WBC 5.1 2018 06:22 AM    RBC 4.40 2018 06:22 AM    HGB 12.5 2018 06:22 AM    HCT 35.5 2018 06:22 AM    PLATELET 122  06:22 AM         Assessment:   41 y.o.   Cervical stenosis h/o cryo, no prior endometrial sampling done. Abnormal uterine bleeding with suspected endometrial polyps on US    Past Medical History:   Diagnosis Date    Abnormal Pap smear of cervix     per pt, had cryo     H/O mammogram 18    normal per pt.      Pap smear for cervical cancer screening 2018 Normal per pt. Plan:     Procedure(s) (LRB):  HYSTEROSCOPY, DILITATION AND CURRETAGE, POLYPECTOMY WITH TRUE CLEAR (N/A)  Discussed the risks of surgery including the risks of bleeding, infection, deep vein thrombosis, and surgical injuries to internal organs including but not limited to the bowels, bladder, rectum, and female reproductive organs. The patient understands the risks; any and all questions were answered to the patient's satisfaction. Patient desires surgical intervention. All questions were answered.     Signed By:  She Jiménez MD     July 2, 2018

## 2018-07-02 NOTE — ANESTHESIA PREPROCEDURE EVALUATION
Anesthetic History   No history of anesthetic complications            Review of Systems / Medical History  Patient summary reviewed and pertinent labs reviewed    Pulmonary  Within defined limits                 Neuro/Psych   Within defined limits           Cardiovascular  Within defined limits                Exercise tolerance: >4 METS     GI/Hepatic/Renal  Within defined limits              Endo/Other  Within defined limits           Other Findings              Physical Exam    Airway  Mallampati: II  TM Distance: 4 - 6 cm  Neck ROM: normal range of motion   Mouth opening: Normal     Cardiovascular    Rhythm: regular  Rate: normal         Dental    Dentition: Upper dentition intact and Lower dentition intact     Pulmonary  Breath sounds clear to auscultation               Abdominal         Other Findings            Anesthetic Plan    ASA: 1  Anesthesia type: general          Induction: Intravenous  Anesthetic plan and risks discussed with: Patient

## 2018-07-02 NOTE — DISCHARGE INSTRUCTIONS
[address] [facility]  [email or web address]  [phone]    Bob Heredia MD, FACOG         POSTOPERATIVE INSTRUCTIONS  FOR D&C, HYSTEROSCOPY  FROM YOUR PHYSICIAN    A dilation and curettage (D &C) is a fairly minor procedure. Your recovery from this procedure should be relatively quick and uneventful. There are a few points that we ask you to remember: You may resume your usual diet once the nausea resolves. Initially, try sips of warm fluids and a bland diet. Gradually increase your activity. First, try walking and doing light activity. Resume your normal habits if no significant discomfort or bleeding develops. Most women can return to normal activity and work within one to two days after this procedure. Absolutely no intercourse, douching or tampon use for two weeks. If you continue to bleed, do not place anything in the vagina until your post-operative visit. Do not drive if you are taking narcotic pain medication or cannot slam on the brakes in case of emergency. You can expect to have some vaginal bleeding or bloody vaginal discharge for the next 2 to 4 weeks. You may take tub baths after one week and showers at any time. Notify your physician if you experience:     a.  heavy vaginal bleeding or foul vaginal discharge    b.  temperature of 101° or greater    c.  severe pelvic or vaginal pain    Please call the office as soon as possible at [phone] to schedule your postoperative follow-up appointment in 2-3 months to evaluate your symptoms and bleeding. For pain or cramping, you make take Ibuprofen or another NSAID. You may also use a heating pad or warm compress. You may also have a pain medication prescribed from your physician. Please read the instructions that come with any prescription. Contact the office if you have any questions or concerns.       You may obtain the results from any tissue sent to the lab at the time of your surgery within two weeks of your procedure. If you have not been contacted within this time frame, please contact our office. The details of the pathology will be reviewed at your postoperative appointment. Above all, please notify us of any problems, concerns, or questions and thank you for allowing us to participate in your healthcare. In an emergency, you may contact a doctor 24 hours a day at [phone]. Additional Discharge Instructions    Please read all of your discharge instructions  Follow all of your medication instructions carefully  Call our office on the next business day to schedule your follow-up appointment  If you have any questions or concerns, please contact us at [phone] or if the situation is urgent contact [phone]  Become a [employer] My Chart user so you can access information, results and appointments: go to [email or web address]. The [employer] is to bring compassion to healthcare and to be good help to those in need. We aim at providing quality healthcare with an emphasis on respect, justice, compassion, stewardship, integrity, growth and innovation. If you did not receive excellent communication, compassionate care and an outstanding patient experience, please notify Larue Hashimoto at [email or web address] or [phone] or discuss your concerns with me at your next visit so that we can always meet our mission and your expectations      Balbina Lewis MD  [address] [place] [address], [address]  [email or web address]   [phone]   Good Help to Those in [place] from your Nurse    The following personal items collected during your admission are returned to you:   Dental Appliance: Dental Appliances: None  Vision: Visual Aid: Glasses  Hearing Aid:    Jewelry: Jewelry: None  Clothing: Clothing: Dress, Footwear, Undergarments  Other Valuables:  Other Valuables: Elina Benitez (given to )  Valuables sent to safe:      PATIENT INSTRUCTIONS:    After general anesthesia or intravenous sedation, for 24 hours or while taking prescription Narcotics:  · Limit your activities  · Do not drive and operate hazardous machinery  · Do not make important personal or business decisions  · Do  not drink alcoholic beverages  · If you have not urinated within 8 hours after discharge, please contact your surgeon on call. Report the following to your surgeon:  · Excessive pain, swelling, redness or odor of or around the surgical area  · Temperature over 100.5  · Nausea and vomiting lasting longer than 4 hours or if unable to take medications  · Any signs of decreased circulation or nerve impairment to extremity: change in color, persistent  numbness, tingling, coldness or increase pain  · Any questions    COUGH AND DEEP BREATHE    Breathing deep and coughing are very important exercises to do after surgery. Deep breathing and coughing open the little air tubes and air sacks in your lungs. You take deep breaths every day. You may not even notice - it is just something you do when you sigh or yawn. It is a natural exercise you do to keep these air passages open. After surgery, take deep breaths and cough, on purpose. Coughing and deep breathing help prevent bronchitis and pneumonia after surgery. If you had chest or belly surgery, use a pillow as a \"hug buddy\" and hold it tightly to your chest or belly when you cough. DIRECTIONS:  6. Take 10 to 15 slow deep breaths every hour while awake. 7. Breathe in deeply, and hold it for 2 seconds. 8. Exhale slowly through puckered lips, like blowing up a balloon. 9. After every 4th or 5th deep breath, hug your pillow to your chest or belly and give a hard, deep cough. Yes, it will probably hurt. But doing this exercise is very important part of healing after surgery.   Take your pain medicine to help you do this exercise without too much pain. IF YOU HAVE BEEN DIAGNOSED WITH SLEEP APNEA, PLEASE USE YOUR SLEEP APNEA DEVICE OR CPAP MACHINE WHEN YOU INTEND TO NAP AFTER TAKING PAIN MEDICATION. Ankle Pumps    Ankle pumps increase the circulation of oxygenated blood to your lower extremities and decrease your risk for circulation problems such as blood clots. They also stretch the muscles, tendons and ligaments in your foot and ankle, and prevent joint contracture in the ankle and foot, especially after surgeries on the legs. It is important to do ankle pump exercises regularly after surgery because immobility increases your risk for developing a blood clot. Your doctor may also have you take an Aspirin for the next few days as well. If your doctor did not ask you to take an Aspirin, consult with him before starting Aspirin therapy on your own. Slowly point your foot forward, feeling the muscles on the top of your lower leg stretch, and hold this position for 5 seconds. Next, pull your foot back toward you as far as possible, stretching the calf muscles, and hold that position for 5 seconds. Repeat with the other foot. Perform 10 repetitions every hour while awake for both ankles if possible (down and then up with the foot once is one repetition). You should feel gentle stretching of the muscles in your lower leg when doing this exercise. If you feel pain, or your range of motion is limited, don't  Push too hard. Only go the limit your joint and muscles will let you go. If you have increasing pain, progressively worsening leg warmth or swelling, STOP the exercise and call your doctor.      Below is information about the medications your doctor is prescribing after your visit:    Other information in your discharge envelope:  []     PRESCRIPTIONS  []     PHYSICAL THERAPY PRESCRIPTION  []     APPOINTMENT CARDS  []     Regional Anesthesia Pamphlet for block or block with On-Q Catheter from Anesthesia Service  []     Medical device information sheets/pamphlets from their    []     School/work excuse note. []     /parent work excuse note. These are general instructions for a healthy lifestyle:    *  Please give a list of your current medications to your Primary Care Provider. *  Please update this list whenever your medications are discontinued, doses are      changed, or new medications (including over-the-counter products) are added. *  Please carry medication information at all times in case of emergency situations. About Smoking  No smoking / No tobacco products / Avoid exposure to second hand smoke    Surgeon General's Warning:  Quitting smoking now greatly reduces serious risk to your health. Obesity, smoking, and sedentary lifestyle greatly increases your risk for illness and disease. A healthy diet, regular physical exercise & weight monitoring are important for maintaining a healthy lifestyle. Congestive Heart Failure  You may be retaining fluid if you have a history of heart failure or if you experience any of the following symptoms:  Weight gain of 3 pounds or more overnight or 5 pounds in a week, increased swelling in our hands or feet or shortness of breath while lying flat in bed. Please call your doctor as soon as you notice any of these symptoms; do not wait until your next office visit. Recognize signs and symptoms of STROKE:  F - face looks uneven  A - arms unable to move or move even  S - speech slurred or non-existent  T - time-call 911 as soon as signs and symptoms begin-DO NOT go         Back to bed or wait to see if you get better-TIME IS BRAIN. Warning signs of HEART ATTACK  Call 911 if you have these symptoms    · Chest discomfort. Most heart attacks involve discomfort in the center of the chest that lasts more than a few minutes, or that goes away and comes back.   It can feel like uncomfortable pressure, squeezing, fullness, or pain.  · Discomfort in other areas of the upper body. Symptoms can include pain or discomfort in one or both        Arms, the back, neck, jaw, or stomach. ·  Shortness of breath with or without chest discomfort. · Other signs may include breaking out in a cold sweat, nausea, or lightheadedness    Don't wait more than five minutes to call 911 - MINUTES MATTER! Fast action can save your life. Calling 911 is almost always the fastest way to get lifesaving treatment. Emergency Medical Services staff can begin treatment when they arrive - up to an hour sooner than if someone gets to the hospital by car. [facility] MEDICATION AND SIDE EFFECT GUIDE    The [facility] MEDICATION AND SIDE EFFECT GUIDE was provided to the PATIENT AND CARE PROVIDER.   Information provided includes instruction about drug purpose and common side effects for the following medications:    ·

## 2018-07-02 NOTE — IP AVS SNAPSHOT
[address] [facility] 
 
 
 [address] [facility] 
[phone] Patient: Lauro Diaz MRN: [id number] AOT:52/49/[birth year]01 About your hospitalization You were admitted on:  July 2, 2018 You last received care in the:  [facility] PACU You were discharged on:  July 2, 2018 Why you were hospitalized Your primary diagnosis was:  Not on File Follow-up Information Follow up With Details Comments Contact Info Bertrand Brannon MD In 2 months GYN follow up [address] 1007 [facility] 
[phone] Ceci Toribio PA-C   [address] [facility] 7 [address] 
[phone] Discharge Orders None A check manny indicates which time of day the medication should be taken. My Medications START taking these medications Instructions Each Dose to Equal  
 Morning Noon Evening Bedtime  
 ibuprofen 600 mg tablet Commonly known as:  MOTRIN Your last dose was: Your next dose is: Take 1 Tab by mouth every six (6) hours as needed for Pain. Take with food. 600 mg CONTINUE taking these medications Instructions Each Dose to Equal  
 Morning Noon Evening Bedtime PRENATAL PO Your last dose was: Your next dose is: Take 1 Tab by mouth daily. 1 Tab PROBIOTIC PO Your last dose was: Your next dose is: Take 1 Cap by mouth daily. 1 Cap Where to Get Your Medications These medications were sent to [facility]/pharmacy #[id number]- [place], [address] AT [facility]  [address] [facility]  0944 [facility], [address] [place] [address] Phone:  [phone]  
  ibuprofen 600 mg tablet Discharge Instructions [address] [facility] 
[email or web address] 
[phone] Cleveland Caro MD, [address] [facility]  
 
 
 
 POSTOPERATIVE INSTRUCTIONS 
FOR D&C, HYSTEROSCOPY 
FROM YOUR PHYSICIAN 
 
A dilation and curettage (D &C) is a fairly minor procedure. Your recovery from this procedure should be relatively quick and uneventful. There are a few points that we ask you to remember: You may resume your usual diet once the nausea resolves. Initially, try sips of warm fluids and a bland diet. Gradually increase your activity. First, try walking and doing light activity. Resume your normal habits if no significant discomfort or bleeding develops. Most women can return to normal activity and work within one to two days after this procedure. Absolutely no intercourse, douching or tampon use for two weeks. If you continue to bleed, do not place anything in the vagina until your post-operative visit. Do not drive if you are taking narcotic pain medication or cannot slam on the brakes in case of emergency. You can expect to have some vaginal bleeding or bloody vaginal discharge for the next 2 to 4 weeks. You may take tub baths after one week and showers at any time. Notify your physician if you experience:  
  a.  heavy vaginal bleeding or foul vaginal discharge 
  b.  temperature of 101° or greater 
  c.  severe pelvic or vaginal pain Please call the office as soon as possible at [phone] to schedule your postoperative follow-up appointment in 2-3 months to evaluate your symptoms and bleeding. For pain or cramping, you make take Ibuprofen or another NSAID. You may also use a heating pad or warm compress. You may also have a pain medication prescribed from your physician. Please read the instructions that come with any prescription. Contact the office if you have any questions or concerns. You may obtain the results from any tissue sent to the lab at the time of your surgery within two weeks of your procedure.   If you have not been contacted within this time frame, please contact our office. The details of the pathology will be reviewed at your postoperative appointment. Above all, please notify us of any problems, concerns, or questions and thank you for allowing us to participate in your healthcare. In an emergency, you may contact a doctor 24 hours a day at [phone]. Additional Discharge Instructions Please read all of your discharge instructions Follow all of your medication instructions carefully Call our office on the next business day to schedule your follow-up appointment If you have any questions or concerns, please contact us at [phone] or if the situation is urgent contact 9-1-1 Become a [facility] My Chart user so you can access information, results and appointments: go to [email or web address]. The [address] is to bring compassion to healthcare and to be good help to those in need. We aim at providing quality healthcare with an emphasis on respect, justice, compassion, stewardship, integrity, growth and innovation. If you did not receive excellent communication, compassionate care and an outstanding patient experience, please notify Gildardo Arndt at [email or web address] or [phone] or discuss your concerns with me at your next visit so that we can always meet our mission and your expectations Dany Queen MD 
[place] [address], [address] 
[email or web address]  
[phone] Good Help to Those in Need® DISCHARGE SUMMARY from your Nurse The following personal items collected during your admission are returned to you:  
Dental Appliance: Dental Appliances: None Vision: Visual Aid: Glasses Hearing Aid:   
Jewelry: Jewelry: None Clothing: Clothing: Dress, Footwear, Undergarments Other Valuables: Other Valuables: Purse (given to ) Valuables sent to safe: PATIENT INSTRUCTIONS: 
 
After general anesthesia or intravenous sedation, for 24 hours or while taking prescription Narcotics: · Limit your activities · Do not drive and operate hazardous machinery · Do not make important personal or business decisions · Do  not drink alcoholic beverages · If you have not urinated within 8 hours after discharge, please contact your surgeon on call. Report the following to your surgeon: 
· Excessive pain, swelling, redness or odor of or around the surgical area · Temperature over 100.5 · Nausea and vomiting lasting longer than 4 hours or if unable to take medications · Any signs of decreased circulation or nerve impairment to extremity: change in color, persistent  numbness, tingling, coldness or increase pain · Any questions [address] Breathing deep and coughing are very important exercises to do after surgery. Deep breathing and coughing open the little air tubes and air sacks in your lungs. You take deep breaths every day. You may not even notice - it is just something you do when you sigh or yawn. It is a natural exercise you do to keep these air passages open. After surgery, take deep breaths and cough, on purpose. Coughing and deep breathing help prevent bronchitis and pneumonia after surgery. If you had chest or belly surgery, use a pillow as a \"hug buddy\" and hold it tightly to your chest or belly when you cough. DIRECTIONS: 
6. Take 10 to 15 slow deep breaths every hour while awake. 7. Breathe in deeply, and hold it for 2 seconds. 8. Exhale slowly through puckered lips, like blowing up a balloon. 9. After every 4th or 5th deep breath, hug your pillow to your chest or belly and give a hard, deep cough. Yes, it will probably hurt. But doing this exercise is very important part of healing after surgery. Take your pain medicine to help you do this exercise without too much pain. IF YOU HAVE BEEN DIAGNOSED WITH SLEEP APNEA, PLEASE USE YOUR SLEEP APNEA DEVICE OR CPAP MACHINE WHEN YOU INTEND TO NAP AFTER TAKING PAIN MEDICATION. Ankle Pumps Ankle pumps increase the circulation of oxygenated blood to your lower extremities and decrease your risk for circulation problems such as blood clots. They also stretch the muscles, tendons and ligaments in your foot and ankle, and prevent joint contracture in the ankle and foot, especially after surgeries on the legs. It is important to do ankle pump exercises regularly after surgery because immobility increases your risk for developing a blood clot. Your doctor may also have you take an Aspirin for the next few days as well. If your doctor did not ask you to take an Aspirin, consult with him before starting Aspirin therapy on your own. Slowly point your foot forward, feeling the muscles on the top of your lower leg stretch, and hold this position for 5 seconds. Next, pull your foot back toward you as far as possible, stretching the calf muscles, and hold that position for 5 seconds. Repeat with the other foot. Perform 10 repetitions every hour while awake for both ankles if possible (down and then up with the foot once is one repetition). You should feel gentle stretching of the muscles in your lower leg when doing this exercise. If you feel pain, or your range of motion is limited, don't  Push too hard. Only go the limit your joint and muscles will let you go. If you have increasing pain, progressively worsening leg warmth or swelling, STOP the exercise and call your doctor. Below is information about the medications your doctor is prescribing after your visit: 
 
Other information in your discharge envelope: 
[]     PRESCRIPTIONS []     PHYSICAL THERAPY PRESCRIPTION 
[]     APPOINTMENT CARDS []     Regional Anesthesia Pamphlet for block or block with On-Q Catheter from Anesthesia Service []     Medical device information sheets/pamphlets from their   
[]     School/work excuse note. []     /parent work excuse note. These are general instructions for a healthy lifestyle: *  Please give a list of your current medications to your Primary Care Provider. *  Please update this list whenever your medications are discontinued, doses are 
    changed, or new medications (including over-the-counter products) are added. *  Please carry medication information at all times in case of emergency situations. About Smoking No smoking / No tobacco products / Avoid exposure to second hand smoke Surgeon General's Warning:  Quitting smoking now greatly reduces serious risk to your health. Obesity, smoking, and sedentary lifestyle greatly increases your risk for illness and disease. A healthy diet, regular physical exercise & weight monitoring are important for maintaining a healthy lifestyle. Congestive Heart Failure You may be retaining fluid if you have a history of heart failure or if you experience any of the following symptoms:  Weight gain of 3 pounds or more overnight or 5 pounds in a week, increased swelling in our hands or feet or shortness of breath while lying flat in bed. Please call your doctor as soon as you notice any of these symptoms; do not wait until your next office visit. Recognize signs and symptoms of STROKE: 
F - face looks uneven A - arms unable to move or move even S - speech slurred or non-existent T - time-call 911 as soon as signs and symptoms begin-DO NOT go Back to bed or wait to see if you get better-TIME IS BRAIN. Warning signs of HEART ATTACK Call 911 if you have these symptoms · Chest discomfort. Most heart attacks involve discomfort in the center of the chest that lasts more than a few minutes, or that goes away and comes back. It can feel like uncomfortable pressure, squeezing, fullness, or pain. · Discomfort in other areas of the upper body. Symptoms can include pain or discomfort in one or both Arms, the back, neck, jaw, or stomach. ·  Shortness of breath with or without chest discomfort. · Other signs may include breaking out in a cold sweat, nausea, or lightheadedness Don't wait more than five minutes to call 911 - MINUTES MATTER! Fast action can save your life. Calling 911 is almost always the fastest way to get lifesaving treatment. Emergency Medical Services staff can begin treatment when they arrive - up to an hour sooner than if someone gets to the hospital by car. [facility] MEDICATION AND SIDE EFFECT GUIDE The [facility] [facility]3] [place] EFFECT GUIDE was provided to the PATIENT AND CARE PROVIDER. Information provided includes instruction about drug purpose and common side effects for the following medications: · Introducing [facility]! Ryan Perez introduces Repligen patient portal. Now you can access parts of your medical record, email your doctor's office, and request medication refills online. 1. In your internet browser, go to [email or web address] 2. Click on the First Time User? Click Here link in the Sign In box. You will see the New Member Sign Up page. 3. Enter your Repligen Access Code exactly as it appears below. You will not need to use this code after youve completed the sign-up process. If you do not sign up before the expiration date, you must request a new code. · Repligen Access Code: [email or web address] Expires: 9/27/2018 10:46 AM 
 
4. Enter the last four digits of your Social Security Number (xxxx) and Date of Birth (mm/dd/yyyy) as indicated and click Submit. You will be taken to the next sign-up page. 5. Create a Repligen ID. This will be your Repligen login ID and cannot be changed, so think of one that is secure and easy to remember. 6. Create a [employer] password. You can change your password at any time. 7. Enter your Password Reset Question and Answer. This can be used at a later time if you forget your password. 8. Enter your e-mail address. You will receive e-mail notification when new information is available in [address] 9. Click Sign Up. You can now view and download portions of your medical record. 10. Click the Download Summary menu link to download a portable copy of your medical information. If you have questions, please visit the Frequently Asked Questions section of the [employer] website. Remember, [employer] is NOT to be used for urgent needs. For medical emergencies, dial 911. Now available from your iPhone and Android! Introducing Denzel Tang As a [facility] patient, I wanted to make you aware of our electronic visit tool called Denzel Tang. [employer] allows you to connect within minutes with a medical provider 24 hours a day, seven days a week via a mobile device or tablet or logging into a secure website from your computer. You can access Denzel Tang from anywhere in the [place]. A virtual visit might be right for you when you have a simple condition and feel like you just dont want to get out of bed, or cant get away from work for an appointment, when your regular [facility] provider is not available (evenings, weekends or holidays), or when youre out of town and need minor care. Electronic visits cost only $49 and if the [employer] provider determines a prescription is needed to treat your condition, one can be electronically transmitted to a nearby pharmacy*. Please take a moment to enroll today if you have not already done so. The enrollment process is free and takes just a few minutes. To enroll, please download the Tailored lisa to your tablet or phone, or visit [email or web address] to enroll on your computer. And, as an [address] [place] [address] patient with a [employer] account, the results of your visits will be scanned into your electronic medical record and your primary care provider will be able to view the scanned results. We urge you to continue to see your regular Farhana Loud provider for your ongoing medical care. And while your primary care provider may not be the one available when you seek a Denzel Wilsonfin virtual visit, the peace of mind you get from getting a real diagnosis real time can be priceless. For more information on Denzel Wilsonfin, view our Frequently Asked Questions (FAQs) at [email or web address]. Sincerely, 
 
Ra Fajardo MD 
Chief Medical Officer [address] *:  certain medications cannot be prescribed via [employer] Providers Seen During Your Hospitalization Provider Specialty Primary office phone Joe Wall MD Obstetrics & Gynecology [phone] Your Primary Care Physician (PCP) Primary Care Physician Office Phone Office Fax 21 [address] [phone] You are allergic to the following No active allergies Recent Documentation Height Weight BMI OB Status Smoking Status 1.683 m 79.4 kg 28.04 kg/m2 Having regular periods Never Smoker Emergency Contacts Name Discharge Info Relation Home Work Mobile BrownSantana DISCHARGE CAREGIVER [3] Spouse [3]   [phone] Patient Belongings The following personal items are in your possession at time of discharge: 
  Dental Appliances: None  Visual Aid: Glasses      Home Medications: None   Jewelry: None  Clothing: Dress, Footwear, Undergarments    Other Valuables: Purse (given to ) Please provide this summary of care documentation to your next provider. Signatures-by signing, you are acknowledging that this After Visit Summary has been reviewed with you and you have received a copy. Patient Signature:  ____________________________________________________________ Date:  ____________________________________________________________  
  
Zoila Feller Provider Signature:  ____________________________________________________________ Date:  ____________________________________________________________

## 2018-07-03 NOTE — PROGRESS NOTES
Pathology:  Benign endometrium (secretory) no atypical cells/cancer  Notify patient.   Update FS per pathology protocol 1/16  rec menstrual calendar and keep postop/gyn fu

## 2018-07-05 NOTE — PROGRESS NOTES
Patient aware of results and MD recommendations by phone. Patient wanted to know how she could a copy of those records and advised that she would need to sign a records release. Patient verbalized understanding.

## 2020-09-15 ENCOUNTER — HOSPITAL ENCOUNTER (OUTPATIENT)
Dept: MAMMOGRAPHY | Age: 50
Discharge: HOME | End: 2020-09-15
Attending: PHYSICIAN ASSISTANT
Payer: COMMERCIAL

## 2020-09-15 DIAGNOSIS — Z12.31 VISIT FOR SCREENING MAMMOGRAM: ICD-10-CM

## 2020-09-15 PROCEDURE — 77067 SCR MAMMO BI INCL CAD: CPT

## 2021-04-19 ENCOUNTER — OFFICE VISIT (OUTPATIENT)
Dept: OBGYN CLINIC | Age: 51
End: 2021-04-19
Payer: COMMERCIAL

## 2021-04-19 VITALS
DIASTOLIC BLOOD PRESSURE: 73 MMHG | BODY MASS INDEX: 28.8 KG/M2 | HEART RATE: 84 BPM | HEIGHT: 66 IN | SYSTOLIC BLOOD PRESSURE: 118 MMHG | WEIGHT: 179.2 LBS

## 2021-04-19 DIAGNOSIS — N93.9 ABNORMAL UTERINE BLEEDING (AUB): Primary | ICD-10-CM

## 2021-04-19 DIAGNOSIS — Z80.49 FH: UTERINE CANCER: ICD-10-CM

## 2021-04-19 DIAGNOSIS — Z30.09 FAMILY PLANNING: ICD-10-CM

## 2021-04-19 LAB
HCG URINE, QL. (POC): NEGATIVE
VALID INTERNAL CONTROL?: YES

## 2021-04-19 PROCEDURE — 81025 URINE PREGNANCY TEST: CPT | Performed by: OBSTETRICS & GYNECOLOGY

## 2021-04-19 PROCEDURE — 99214 OFFICE O/P EST MOD 30 MIN: CPT | Performed by: OBSTETRICS & GYNECOLOGY

## 2021-04-19 RX ORDER — BISMUTH SUBSALICYLATE 262 MG
1 TABLET,CHEWABLE ORAL DAILY
COMMUNITY

## 2021-04-19 NOTE — PROGRESS NOTES
[facility]  [email or web address]  [phone]    Darnell Coleman MD, FACOG       OB/GYN Problem visit    Chief Complaint:   Chief Complaint   Patient presents with    Irregular Menses       Last or next WWE is: AE due    History of Present Illness: This is a new problem being evaluated by this provider. The patient is a 52 y.o. [de-identified]  female. She stopped taking fertile tonic pill BID on 3/1/21 & started bleeding the first week of march then the cycle stopped then started 3/14/21 & kept a flow for the next 2 weeks. She has not had a cycle in April. Pt reports her cycle was not heavy (she could use a liner) & the blood was bright red. Denies cramping. Denies new partners. She reports the symptoms are is unchanged. Aggravating factors include none. Alleviating factors include none. Desires fertility. [facility]    Worried about FH endo cancer. She does not have other concerns. LMP: Patient's last menstrual period was 03/14/2021 (exact date). [address] [facility] [address]:  Past Medical History:   Diagnosis Date    Abnormal Pap smear of cervix 2007    per pt, had cryo     H/O mammogram 12/28/18    normal per pt.  Pap smear for cervical cancer screening 01/29/2018    Normal per pt.      Past Surgical History:   Procedure Laterality Date    HX GYN  2007    Cryo for abnl pap per pt- location unknonwn for records b/c office is closed    HX HYSTEROSCOPY  07/02/2018    TP: hysteroscopy, D&C, polypectomy w/ truclear: Benign endometrium (secretory) no atypical cells/cancer    HX OTHER SURGICAL  2016    toe surgery     Family History   Problem Relation Age of Onset    Breast Cancer Sister 40        double masectomy    Cancer Sister         uterine hysterectomy for possible cancer    Cancer Mother         uterine     Social History     Tobacco Use    Smoking status: Never Smoker    Smokeless tobacco: Never Used   Substance Use Topics    Alcohol use: No    Drug use: No     No Known Allergies  Current Outpatient Medications   Medication Sig    multivitamin (ONE A DAY) tablet Take 1 Tab by mouth daily.  OTHER fertile tonic    ibuprofen (MOTRIN) 600 mg tablet Take 1 Tab by mouth every six (6) hours as needed for Pain. Take with food.  PNV AD.38/BLBBUPH fum/folic ac (PRENATAL PO) Take 1 Tab by mouth daily.  Lactobacillus acidophilus (PROBIOTIC PO) Take 1 Cap by mouth daily. No current facility-administered medications for this visit. Review of Systems:  History obtained from the patient  Constitutional: negative for fevers, chills and weight loss  ENT ROS: negative for - hearing change, oral lesions or visual changes  Respiratory: negative for cough, wheezing or dyspnea on exertion  Cardiovascular: negative for chest pain, irregular heart beats, exertional chest pressure/discomfort  Gastrointestinal: negative for dysphagia, nausea and vomiting  Genito-Urinary ROS:  see HPI  Inteument/breast: negative for rash, breast lump and nipple discharge  Musculoskeletal:negative for stiff joints, neck pain and muscle weakness  Endocrine ROS: negative for - breast changes, galactorrhea or temperature intolerance  Hematological and Lymphatic ROS: negative for - blood clots, bruising or swollen lymph nodes    Physical Exam:  Visit Vitals  /73 (BP 1 Location: Right arm, BP Patient Position: Sitting, BP Cuff Size: Adult)   Pulse 84   Ht 5' 6.25\" (1.683 m)   Wt 179 lb 3.2 oz (81.3 kg)   BMI 28.71 kg/m²       GENERAL: alert, well appearing, and in no distress  HEAD: normocephalic, atraumatic.    PULM: clear to auscultation, no wheezes, rales or rhonchi, symmetric air entry   COR: normal rate and regular rhythm, S1 and S2 normal   ABDOMEN: soft, nontender, nondistended, no masses or organomegaly   EGBUS: no lesions, no inflammation, no masses  VULVA: normal appearing vulva with no masses, tenderness or lesions  VAGINA: normal appearing vagina with normal color, no lesions, brown tinged discharge  CERVIX: normal appearing cervix without discharge or lesions, non tender  UTERUS: uterus is normal size, shape, consistency and nontender   ADNEXA: normal adnexa in size, nontender and no masses  NEURO: alert, oriented, normal speech    Assessment:  Encounter Diagnoses   Name Primary?  Abnormal uterine bleeding (AUB) Yes    Comment: doug endo polyps/surgically removed    Family planning     FH: uterine cancer        Plan:  The patient is advised that she should contact the office if she does not note improvement or if symptoms recur  Recommend follow up with PCP for non-gynecologic complaints and chronic medical problems. She should contact our office with any questions or concerns  She could keep her routine annual exam appointment. Disc dec fertility with age and option of MONTY  Fu and US, probable endo bx,   Get copy of pap/wwe or do at nv if due  Labs  Bleeding precautions  Disc provera; Hold for now  Reviewed prior US; polyps    I spent 30 minutes of face to face time counseling and discussing aub, fertility, endo bx: ho given, vs D and C  with the patient. More than 50 % of her visit was spent performing counseling. Reviewed prior path: benign endometrium (polypectomy)    We discussed potential causes of symptomatic bleeding: including but not limited to hormonal, medical, infection/inflammation and structural etiologies. We discussed options for managing symptoms including but not limited to observation, NSAIDS, hormonal management, IUDs. Pt desires to maintain option for fertility     With pt in chart, reviewing records x 49 minutes.       Orders Placed This Encounter    PROLACTIN    TSH 3RD GENERATION    AMB POC URINE PREGNANCY TEST, VISUAL COLOR COMPARISON       Results for orders placed or performed in visit on 04/19/21   AMB POC URINE PREGNANCY TEST, VISUAL COLOR COMPARISON   Result Value Ref Range    VALID INTERNAL CONTROL POC Yes     HCG urine, Ql. (POC) Negative Negative

## 2021-04-20 LAB
PROLACTIN SERPL-MCNC: 14.4 NG/ML (ref 4.8–23.3)
TSH SERPL DL<=0.005 MIU/L-ACNC: 0.96 UIU/ML (ref 0.45–4.5)

## 2021-04-20 NOTE — PROGRESS NOTES
The results are normal.   Please notify patient if nCrypted Cloud message not read or not active. Recommend f/u if still having symptoms/problems or has additional concerns.

## 2021-04-22 ENCOUNTER — TELEPHONE (OUTPATIENT)
Dept: OBGYN CLINIC | Age: 51
End: 2021-04-22

## 2021-04-22 LAB
C TRACH RRNA SPEC QL NAA+PROBE: NEGATIVE
N GONORRHOEA RRNA SPEC QL NAA+PROBE: NEGATIVE
SPECIMEN STATUS REPORT, ROLRST: NORMAL

## 2021-04-22 NOTE — PROGRESS NOTES
Failed attempt to reach pt via phone; see encounter. She needs to be notified of normal lab results since she is not active on MyChart.

## 2021-04-22 NOTE — TELEPHONE ENCOUNTER
----- Message from Jessica Bowden MD sent at 4/20/2021  3:41 PM EDT -----  The results are normal.   Please notify patient if [employer] message not read or not active. Recommend f/u if still having symptoms/problems or has additional concerns.

## 2021-04-22 NOTE — TELEPHONE ENCOUNTER
Could not LVM requesting Ms. Brown to contact the office back at her earliest convenience to advise of Jessica Lantigua MD's result note and/or recommendations because her VM box was full. She needs to be notified her lab results were normal since she is not active on MyChart.

## 2021-04-23 NOTE — TELEPHONE ENCOUNTER
Follow up call to Ms. Kevin Platt Pt verified self and birth date for privacy precautions. Patient was advised of normal results per MD. Ms. Ej Carroll acknowledged understanding and all questions were answered to patients satisfaction. No further questions or concerns at this time.

## 2021-05-18 ENCOUNTER — OFFICE VISIT (OUTPATIENT)
Dept: OBGYN CLINIC | Age: 51
End: 2021-05-18

## 2021-05-18 VITALS
HEART RATE: 77 BPM | HEIGHT: 66 IN | SYSTOLIC BLOOD PRESSURE: 120 MMHG | WEIGHT: 179.6 LBS | DIASTOLIC BLOOD PRESSURE: 75 MMHG | BODY MASS INDEX: 28.87 KG/M2

## 2021-05-18 DIAGNOSIS — Z01.419 ENCOUNTER FOR WELL WOMAN EXAM WITH ROUTINE GYNECOLOGICAL EXAM: ICD-10-CM

## 2021-05-18 DIAGNOSIS — Z80.49 FH: UTERINE CANCER: ICD-10-CM

## 2021-05-18 DIAGNOSIS — Z01.411 ENCOUNTER FOR GYNECOLOGICAL EXAMINATION (GENERAL) (ROUTINE) WITH ABNORMAL FINDINGS: Primary | ICD-10-CM

## 2021-05-18 DIAGNOSIS — N93.9 ABNORMAL UTERINE BLEEDING (AUB): ICD-10-CM

## 2021-05-18 DIAGNOSIS — Z01.812 PRE-PROCEDURE LAB EXAM: ICD-10-CM

## 2021-05-18 LAB
HCG URINE, QL. (POC): NEGATIVE
VALID INTERNAL CONTROL?: YES

## 2021-05-18 PROCEDURE — 81025 URINE PREGNANCY TEST: CPT | Performed by: OBSTETRICS & GYNECOLOGY

## 2021-05-18 PROCEDURE — 99396 PREV VISIT EST AGE 40-64: CPT | Performed by: OBSTETRICS & GYNECOLOGY

## 2021-05-18 NOTE — PROGRESS NOTES
[facility]  [email or web address]  [phone]    Marshall Davalos MD, FACOG       Annual Gynecologic Exam  [place] 40-60  Chief Complaint   Patient presents with    Well Woman     pap    Follow-up     possible endometrial biopsy    Ultrasound       Ashley Mclean is a 52 y.o.  BLACK/  female who presents for an annual exam.  No LMP recorded (lmp unknown). Patient has the following concerns today: She did not have a cycle in April. On 2021 she complained of starting bleeding the first week of march then the cycle stopped then started 3/14/21 & kept a flow for the next 2 weeks. She did not have a cycle in April. Pt reports her cycle was not heavy (she could use a liner) & the blood was bright red. Denies cramping. Denies new partners. Menstrual status:  She does not report dysmenorrhea/painful menses. She does not report heavy menses. She does not report irregular bleeding. Ultrasound:   TRANSVAGINAL ULTRASOUND PERFORMED  UTERUS IS ANTEVERTED, NORMAL IN SIZE AND ECHOGENICITY. THE UTERUS APPEARS ARCUATE.  ENDOMETRIUM MEASURES 6-7MM IN THICKNESS. NO EVIDENCE OF MASSES OR ABNORMALITIES ARE SEEN. RIGHT OVARY IS NOT SEEN DUE TO BOWEL GAS. THE RIGHT ADNEXA APPEARS WNL. LEFT OVARY APPEARS WITHIN NORMAL LIMITS. NO FREE FLUID SEEN IN THE CDS. Sexual history and Contraception:  Social History     Substance and Sexual Activity   Sexual Activity Yes    Partners: Male    Birth control/protection: None       She does not reports new sexual partner(s) in the last year. Preventive Medicine History:  Her most recent Pap smear result: normal was obtained in 2018    Her most recent HR HPV screen was Negative obtained in 2018    She does not have a history of LINDA 2, 3 or cervical cancer.         Breast health:  [facility] Results (most recent):  Results from [place] encounter on 09/15/20   [facility] MAMMO BI SCREENING INCL CAD    Narrative STUDY:  Bilateral Digital Screening Mammogram    INDICATION:  Screening mammogram.    Direct comparison is made to prior mammograms. BREAST COMPOSITION: The breast tissue is heterogeneously dense, which could  obscure detection of small masses (approximately 51-75% glandular). FINDINGS: Bilateral digital screening mammography was performed, and is  interpreted in conjunction with a computer assisted detection (CAD) system. No  suspicious masses or calcifications are identified. There is no skin thickening  or nipple retraction. There has been no significant change. Impression IMPRESSION:    BIRADS 1: Negative. No mammographic evidence of malignancy. Next screening mammogram is recommended in one year. The patient will be  notified of these results. In accordance with [place] legislation enacted 2012 (32.1-229 of the Code  of [place]), we have informed this patient by letter that she may have dense  breast tissue. Dense breast tissue can hide cancer or other abnormalities. We  have instructed her to contact her referring physician if she has any questions  or concerns about this report. There are many factors that can increase a  woman's risk of developing breast cancer, including a family history of breast  cancer. A woman's lifetime risk of developing breast cancer can be estimated  using the Breast Cancer Risk Assessment Tool, found on the [employer] website (www.cancer.gov/bcrisktool/). The addition of breast MRI as a  screening tool may be useful for women with lifetime risk assessments greater  than 20%. Last mammogram: approximate date 2020 and was normal.   Breast cancer family updated: see FH. Past Medical History:   Diagnosis Date    Abnormal Pap smear of cervix     per pt, had cryo     H/O mammogram 20    normal per pt.  Pap smear for cervical cancer screening 2018    Normal per pt.      OB History    Para Term  AB Living   0 0 0 0 0 0   SAB TAB Ectopic Molar Multiple Live Births   0 0 0 0 0 0       Past Surgical History:   Procedure Laterality Date    HX GYN  2007    Cryo for abnl pap per pt- location unknonwn for records b/c office is closed    HX HYSTEROSCOPY  07/02/2018    TP: hysteroscopy, D&C, polypectomy w/ truclear: Benign endometrium (secretory) no atypical cells/cancer    HX OTHER SURGICAL  2016    toe surgery     Family History   Problem Relation Age of Onset    Breast Cancer Sister 40        double masectomy    Cancer Sister         uterine hysterectomy for possible cancer    Cancer Mother         uterine     Social History     Socioeconomic History    Marital status:      Spouse name: Not on file    Number of children: Not on file    Years of education: Not on file    Highest education level: Not on file   Occupational History    Not on file   Social Needs    Financial resource strain: Not on file    Food insecurity     Worry: Not on file     Inability: Not on file    Transportation needs     Medical: Not on file     Non-medical: Not on file   Tobacco Use    Smoking status: Never Smoker    Smokeless tobacco: Never Used   Substance and Sexual Activity    Alcohol use: No    Drug use: No    Sexual activity: Yes     Partners: Male     Birth control/protection: None   Lifestyle    Physical activity     Days per week: Not on file     Minutes per session: Not on file    Stress: Not on file   Relationships    Social connections     Talks on phone: Not on file     Gets together: Not on file     Attends Yarsani service: Not on file     Active member of club or organization: Not on file     Attends meetings of clubs or organizations: Not on file     Relationship status: Not on file    Intimate partner violence     Fear of current or ex partner: Not on file     Emotionally abused: Not on file     Physically abused: Not on file     Forced sexual activity: Not on file   Other Topics Concern    Not on file   Social History Narrative    Not on file       No Known Allergies    Current Outpatient Medications   Medication Sig    multivitamin (ONE A DAY) tablet Take 1 Tab by mouth daily.  ibuprofen (MOTRIN) 600 mg tablet Take 1 Tab by mouth every six (6) hours as needed for Pain. Take with food.  Lactobacillus acidophilus (PROBIOTIC PO) Take 1 Cap by mouth daily.  OTHER fertile tonic    PNV MANUEL.60/YMELKQL fum/folic ac (PRENATAL PO) Take 1 Tab by mouth daily. No current facility-administered medications for this visit.         Patient Active Problem List   Diagnosis Code    FH: uterine cancer Z80.49       Review of Systems - History obtained from the patient  Constitutional/general, HEENT, CV, Resp, GI, MSK, Neuro, Psych, Heme/lymph, Skin, Breast ROS: no significant complaints except as noted on HPI     Physical Exam  Visit Vitals  /75 (BP 1 Location: Right upper arm, BP Patient Position: Sitting, BP Cuff Size: Adult)   Pulse 77   Ht 5' 6\" (1.676 m)   Wt 179 lb 9.6 oz (81.5 kg)   LMP  (LMP Unknown) Comment: march 2021   BMI 28.99 kg/m²       Constitutional  · Appearance: well-nourished, well developed, alert, in no acute distress    HENT  · Head and Face: appears normal    Neck  · Inspection/Palpation: normal appearance, no masses or tenderness  · Lymph Nodes: no lymphadenopathy present  · Thyroid: gland size normal, nontender, no nodules or masses present on palpation    Chest  · Respiratory Effort: breathing unlabored  · Auscultation: normal breath sounds    Cardiovascular  · Heart:  · Auscultation: regular rate and rhythm without murmur    Breasts  · Inspection of Breasts: breasts symmetrical, no skin changes, no discharge present, nipple appearance normal, no skin retraction present  · Palpation of Breasts and Axillae: no masses present on palpation, no breast tenderness  · Axillary Lymph Nodes: no lymphadenopathy present    Gastrointestinal  · Abdominal Examination: abdomen non-tender to palpation, normal bowel sounds, no masses present  · Liver and spleen: no hepatomegaly present, spleen not palpable  · Hernias: no hernias identified    Genitourinary  · External Genitalia: normal appearance for age, no discharge present, no tenderness present, no inflammatory lesions present, no masses present, without atrophy present  · Vagina: normal vaginal vault without central or paravaginal defects, no discharge present, no inflammatory lesions present, no masses present  · Bladder: non-tender to palpation  · Urethra: appears normal  · Cervix: normal   · Uterus: normal size, shape and consistency  · Adnexa: no adnexal tenderness present, no adnexal masses present  · Perineum: perineum within normal limits, no evidence of trauma, no rashes or skin lesions present  · Anus: anus within normal limits, no hemorrhoids present  · Inguinal Lymph Nodes: no lymphadenopathy present    Skin  · General Inspection: no rash, no lesions identified    Neurologic/Psychiatric  · Mental Status:  · Orientation: grossly oriented to person, place and time  · Mood and Affect: mood normal, affect appropriate    Assessment:  52 y.o.  for well woman exam  Encounter Diagnoses   Name Primary?  Abnormal uterine bleeding (AUB)     Pre-procedure lab exam     Encounter for well woman exam with routine gynecological exam     Encounter for gynecological examination (general) (routine) with abnormal findings Yes    FH: uterine cancer        Plan:  The patient was counseled about healthy lifestyle, disease prevention, and bone protection.   We discussed current self breast exam and mammogram recommendations  We discussed current pap smear and HR HPV testing guidelines  I recommended follow up in one year for routine annual gynecologic exam or sooner if needed  I recommended follow up with a primary care physician for chronic medical problems and evaluation of non-gynecologic concerns and to please contact our office with any GYN questions or concerns. We discussed typical perimenopausal bleeding patterns and symptoms. I recommend that she notify MD for chaotic or symptomatic bleeding, or bleeding in between menses or intermenstrual bleeding for additional evaluation. She can also schedule a consult to discuss management of symptoms of perimenopause that are concerning her or interfering with her life or day to day function or activity. Reviewed labs and US  Disc option of endo bx, pt defers and wants to keep menstrual calendar  Notify MD if AUB recurs  Disc option of hormonal management, pt defers for now  Disc s/sx of uterine cancer/uterine pathology   Reviewed prior SIS and pathology from surgery       Folllow up:  [x] return for annual well woman exam in one year or sooner if she is having problems  [] follow up and ultrasound  [x] mammogram  [] 6 months  [] 3 months  [] 1 month    Orders Placed This Encounter    AMB POC URINE PREGNANCY TEST, VISUAL COLOR COMPARISON    PAP IG, APTIMA HPV AND RFX 16/18,45 ([id number])       Results for orders placed or performed in visit on 05/18/21   AMB POC URINE PREGNANCY TEST, VISUAL COLOR COMPARISON   Result Value Ref Range    VALID INTERNAL CONTROL POC Yes     HCG urine, Ql. (POC) Negative Negative   Results for orders placed or performed in visit on 05/18/21   US TRANSVAGINAL    Narrative    See impression. Impression    Impression: The final result for this exam can be located in this patient's chart with  results from [facility].           [facility]

## 2021-05-22 LAB
CYTOLOGIST CVX/VAG CYTO: NORMAL
CYTOLOGY CVX/VAG DOC CYTO: NORMAL
CYTOLOGY CVX/VAG DOC THIN PREP: NORMAL
CYTOLOGY HISTORY:: NORMAL
DX ICD CODE: NORMAL
HPV I/H RISK 4 DNA CVX QL PROBE+SIG AMP: NEGATIVE
Lab: NORMAL
OTHER STN SPEC: NORMAL
STAT OF ADQ CVX/VAG CYTO-IMP: NORMAL

## 2021-05-23 NOTE — PROGRESS NOTES
Normal pap smear, message sent if [address] active. Update PMH/HM: include: Date of pap, Cytology: wnl. For HR HPV results: list NEG or POS, when done.

## 2022-03-20 PROBLEM — Z80.49 FH: UTERINE CANCER: Status: ACTIVE | Noted: 2021-04-19

## 2022-08-10 ENCOUNTER — TRANSCRIBE ORDER (OUTPATIENT)
Dept: SCHEDULING | Age: 52
End: 2022-08-10

## 2022-08-10 DIAGNOSIS — Z12.31 SCREENING MAMMOGRAM FOR HIGH-RISK PATIENT: Primary | ICD-10-CM

## 2022-09-06 ENCOUNTER — HOSPITAL ENCOUNTER (OUTPATIENT)
Dept: MAMMOGRAPHY | Age: 52
Discharge: HOME | End: 2022-09-06
Payer: COMMERCIAL

## 2022-09-06 DIAGNOSIS — Z12.31 SCREENING MAMMOGRAM FOR HIGH-RISK PATIENT: ICD-10-CM

## 2022-09-06 PROCEDURE — 77067 SCR MAMMO BI INCL CAD: CPT

## 2023-09-20 ENCOUNTER — HOSPITAL ENCOUNTER (OUTPATIENT)
Facility: HOSPITAL | Age: 53
Discharge: HOME | End: 2023-09-23
Payer: COMMERCIAL

## 2023-09-20 DIAGNOSIS — Z12.31 VISIT FOR SCREENING MAMMOGRAM: ICD-10-CM

## 2023-09-20 PROCEDURE — 77067 SCR MAMMO BI INCL CAD: CPT

## 2024-10-23 ENCOUNTER — TRANSCRIBE ORDERS (OUTPATIENT)
Facility: HOSPITAL | Age: 54
End: 2024-10-23

## 2024-10-23 DIAGNOSIS — Z12.31 ENCOUNTER FOR SCREENING MAMMOGRAM FOR MALIGNANT NEOPLASM OF BREAST: Primary | ICD-10-CM

## 2024-12-04 ENCOUNTER — HOSPITAL ENCOUNTER (OUTPATIENT)
Facility: HOSPITAL | Age: 54
Discharge: HOME | End: 2024-12-07
Payer: COMMERCIAL

## 2024-12-04 VITALS — HEIGHT: 66 IN | BODY MASS INDEX: 28.77 KG/M2 | WEIGHT: 179 LBS

## 2024-12-04 DIAGNOSIS — Z12.31 ENCOUNTER FOR SCREENING MAMMOGRAM FOR MALIGNANT NEOPLASM OF BREAST: ICD-10-CM

## 2024-12-04 PROCEDURE — 77063 BREAST TOMOSYNTHESIS BI: CPT

## (undated) DEVICE — PREP PAD BNS: Brand: CONVERTORS

## (undated) DEVICE — DEVICE HYSTEROSCOPIC ROTARY STYL USE W/ 5C SYS TRUCLEAR

## (undated) DEVICE — STERILE POLYISOPRENE POWDER-FREE SURGICAL GLOVES: Brand: PROTEXIS

## (undated) DEVICE — STRAP RESTRAIN W3.5XL19IN TECLIN STRRP POS LEG DURING LITH

## (undated) DEVICE — CATH URETH INTMIT ROB 16FR FUN -- CONVERT TO ITEM 179520

## (undated) DEVICE — X-RAY SPONGES,16 PLY: Brand: DERMACEA

## (undated) DEVICE — TRAY PREP DRY W/ PREM GLV 2 APPL 6 SPNG 2 UNDPD 1 OVERWRAP

## (undated) DEVICE — Z INACTIVE USE 2527070 DRAPE SURG W40XL44IN UNDERBUTTOCK SMS POLYPR W/ PCH BK DISP

## (undated) DEVICE — PERI/GYN PACK: Brand: CONVERTORS

## (undated) DEVICE — HYSTEROSCOPIC PROCEDURE KIT: Brand: QUICKPICK

## (undated) DEVICE — TELFA NON-ADHERENT ABSORBENT DRESSING: Brand: TELFA

## (undated) DEVICE — SOLUTION IRRIG 3000ML 0.9% SOD CHL FLX CONT 0797208] ICU MEDICAL INC]

## (undated) DEVICE — INFECTION CONTROL KIT SYS

## (undated) DEVICE — MARKER SKN RUL VIO STRL

## (undated) DEVICE — DEVON™ KNEE AND BODY STRAP 60" X 3" (1.5 M X 7.6 CM): Brand: DEVON

## (undated) DEVICE — LIGHT HANDLE: Brand: DEVON

## (undated) DEVICE — KENDALL SCD EXPRESS SLEEVES, KNEE LENGTH, MEDIUM: Brand: KENDALL SCD

## (undated) DEVICE — GOWN,SIRUS,NONRNF,SETINSLV,XL,20/CS: Brand: MEDLINE

## (undated) DEVICE — PAD SANIT NPKN 4IN GRD